# Patient Record
Sex: FEMALE | Race: WHITE | NOT HISPANIC OR LATINO | Employment: UNEMPLOYED | URBAN - METROPOLITAN AREA
[De-identification: names, ages, dates, MRNs, and addresses within clinical notes are randomized per-mention and may not be internally consistent; named-entity substitution may affect disease eponyms.]

---

## 2018-12-21 DIAGNOSIS — Z01.812 PRE-OPERATIVE LABORATORY EXAMINATION: ICD-10-CM

## 2018-12-21 LAB
ALBUMIN SERPL BCP-MCNC: 4.3 G/DL (ref 3.2–4.9)
ALBUMIN/GLOB SERPL: 1.4 G/DL
ALP SERPL-CCNC: 47 U/L (ref 30–99)
ALT SERPL-CCNC: 19 U/L (ref 2–50)
ANION GAP SERPL CALC-SCNC: 7 MMOL/L (ref 0–11.9)
AST SERPL-CCNC: 24 U/L (ref 12–45)
BASOPHILS # BLD AUTO: 0.4 % (ref 0–1.8)
BASOPHILS # BLD: 0.03 K/UL (ref 0–0.12)
BILIRUB SERPL-MCNC: 0.5 MG/DL (ref 0.1–1.5)
BUN SERPL-MCNC: 10 MG/DL (ref 8–22)
CALCIUM SERPL-MCNC: 9.5 MG/DL (ref 8.5–10.5)
CHLORIDE SERPL-SCNC: 102 MMOL/L (ref 96–112)
CO2 SERPL-SCNC: 26 MMOL/L (ref 20–33)
CREAT SERPL-MCNC: 0.76 MG/DL (ref 0.5–1.4)
EOSINOPHIL # BLD AUTO: 0.29 K/UL (ref 0–0.51)
EOSINOPHIL NFR BLD: 3.5 % (ref 0–6.9)
ERYTHROCYTE [DISTWIDTH] IN BLOOD BY AUTOMATED COUNT: 42.6 FL (ref 35.9–50)
GLOBULIN SER CALC-MCNC: 3.1 G/DL (ref 1.9–3.5)
GLUCOSE SERPL-MCNC: 66 MG/DL (ref 65–99)
HCT VFR BLD AUTO: 43.6 % (ref 37–47)
HGB BLD-MCNC: 14.3 G/DL (ref 12–16)
IMM GRANULOCYTES # BLD AUTO: 0.03 K/UL (ref 0–0.11)
IMM GRANULOCYTES NFR BLD AUTO: 0.4 % (ref 0–0.9)
LYMPHOCYTES # BLD AUTO: 2.57 K/UL (ref 1–4.8)
LYMPHOCYTES NFR BLD: 30.8 % (ref 22–41)
MCH RBC QN AUTO: 30.3 PG (ref 27–33)
MCHC RBC AUTO-ENTMCNC: 32.8 G/DL (ref 33.6–35)
MCV RBC AUTO: 92.4 FL (ref 81.4–97.8)
MONOCYTES # BLD AUTO: 0.76 K/UL (ref 0–0.85)
MONOCYTES NFR BLD AUTO: 9.1 % (ref 0–13.4)
NEUTROPHILS # BLD AUTO: 4.66 K/UL (ref 2–7.15)
NEUTROPHILS NFR BLD: 55.8 % (ref 44–72)
NRBC # BLD AUTO: 0 K/UL
NRBC BLD-RTO: 0 /100 WBC
PLATELET # BLD AUTO: 298 K/UL (ref 164–446)
PMV BLD AUTO: 10.9 FL (ref 9–12.9)
POTASSIUM SERPL-SCNC: 4.3 MMOL/L (ref 3.6–5.5)
PROT SERPL-MCNC: 7.4 G/DL (ref 6–8.2)
RBC # BLD AUTO: 4.72 M/UL (ref 4.2–5.4)
SODIUM SERPL-SCNC: 135 MMOL/L (ref 135–145)
WBC # BLD AUTO: 8.3 K/UL (ref 4.8–10.8)

## 2018-12-21 PROCEDURE — 36415 COLL VENOUS BLD VENIPUNCTURE: CPT

## 2018-12-21 PROCEDURE — 80053 COMPREHEN METABOLIC PANEL: CPT

## 2018-12-21 PROCEDURE — 85025 COMPLETE CBC W/AUTO DIFF WBC: CPT

## 2018-12-21 RX ORDER — ONDANSETRON 4 MG/1
4 TABLET, ORALLY DISINTEGRATING ORAL PRN
COMMUNITY
End: 2021-10-19

## 2018-12-21 RX ORDER — PANTOPRAZOLE SODIUM 40 MG/1
40 TABLET, DELAYED RELEASE ORAL EVERY MORNING
COMMUNITY
End: 2021-10-19

## 2018-12-26 ENCOUNTER — HOSPITAL ENCOUNTER (OUTPATIENT)
Facility: MEDICAL CENTER | Age: 23
End: 2018-12-26
Attending: SURGERY | Admitting: SURGERY
Payer: COMMERCIAL

## 2018-12-26 VITALS
HEART RATE: 65 BPM | BODY MASS INDEX: 29.8 KG/M2 | HEIGHT: 66 IN | WEIGHT: 185.41 LBS | OXYGEN SATURATION: 97 % | RESPIRATION RATE: 16 BRPM | SYSTOLIC BLOOD PRESSURE: 115 MMHG | DIASTOLIC BLOOD PRESSURE: 77 MMHG | TEMPERATURE: 97.8 F

## 2018-12-26 DIAGNOSIS — G89.18 PAIN FOLLOWING SURGERY OR PROCEDURE: ICD-10-CM

## 2018-12-26 LAB
HCG UR QL: NEGATIVE
PATHOLOGY CONSULT NOTE: NORMAL
SP GR UR REFRACTOMETRY: 1.02

## 2018-12-26 PROCEDURE — 700111 HCHG RX REV CODE 636 W/ 250 OVERRIDE (IP): Performed by: ANESTHESIOLOGY

## 2018-12-26 PROCEDURE — 160035 HCHG PACU - 1ST 60 MINS PHASE I: Performed by: SURGERY

## 2018-12-26 PROCEDURE — A9270 NON-COVERED ITEM OR SERVICE: HCPCS | Performed by: ANESTHESIOLOGY

## 2018-12-26 PROCEDURE — 501571 HCHG TROCAR, SEPARATOR 12X100: Performed by: SURGERY

## 2018-12-26 PROCEDURE — 501570 HCHG TROCAR, SEPARATOR: Performed by: SURGERY

## 2018-12-26 PROCEDURE — 501583 HCHG TROCAR, THRD CAN&SEAL 5X100: Performed by: SURGERY

## 2018-12-26 PROCEDURE — 502571 HCHG PACK, LAP CHOLE: Performed by: SURGERY

## 2018-12-26 PROCEDURE — 160025 RECOVERY II MINUTES (STATS): Performed by: SURGERY

## 2018-12-26 PROCEDURE — 160036 HCHG PACU - EA ADDL 30 MINS PHASE I: Performed by: SURGERY

## 2018-12-26 PROCEDURE — 501399 HCHG SPECIMAN BAG, ENDO CATC: Performed by: SURGERY

## 2018-12-26 PROCEDURE — 501338 HCHG SHEARS, ENDO: Performed by: SURGERY

## 2018-12-26 PROCEDURE — 160039 HCHG SURGERY MINUTES - EA ADDL 1 MIN LEVEL 3: Performed by: SURGERY

## 2018-12-26 PROCEDURE — 700101 HCHG RX REV CODE 250

## 2018-12-26 PROCEDURE — 160048 HCHG OR STATISTICAL LEVEL 1-5: Performed by: SURGERY

## 2018-12-26 PROCEDURE — 160046 HCHG PACU - 1ST 60 MINS PHASE II: Performed by: SURGERY

## 2018-12-26 PROCEDURE — 160009 HCHG ANES TIME/MIN: Performed by: SURGERY

## 2018-12-26 PROCEDURE — 500002 HCHG ADHESIVE, DERMABOND: Performed by: SURGERY

## 2018-12-26 PROCEDURE — 160002 HCHG RECOVERY MINUTES (STAT): Performed by: SURGERY

## 2018-12-26 PROCEDURE — 160028 HCHG SURGERY MINUTES - 1ST 30 MINS LEVEL 3: Performed by: SURGERY

## 2018-12-26 PROCEDURE — 501838 HCHG SUTURE GENERAL: Performed by: SURGERY

## 2018-12-26 PROCEDURE — 81025 URINE PREGNANCY TEST: CPT

## 2018-12-26 PROCEDURE — 700111 HCHG RX REV CODE 636 W/ 250 OVERRIDE (IP)

## 2018-12-26 PROCEDURE — 160047 HCHG PACU  - EA ADDL 30 MINS PHASE II: Performed by: SURGERY

## 2018-12-26 PROCEDURE — 88304 TISSUE EXAM BY PATHOLOGIST: CPT

## 2018-12-26 PROCEDURE — 700102 HCHG RX REV CODE 250 W/ 637 OVERRIDE(OP): Performed by: ANESTHESIOLOGY

## 2018-12-26 RX ORDER — MIDAZOLAM HYDROCHLORIDE 1 MG/ML
1 INJECTION INTRAMUSCULAR; INTRAVENOUS
Status: DISCONTINUED | OUTPATIENT
Start: 2018-12-26 | End: 2018-12-26 | Stop reason: HOSPADM

## 2018-12-26 RX ORDER — OXYCODONE HCL 5 MG/5 ML
5 SOLUTION, ORAL ORAL
Status: COMPLETED | OUTPATIENT
Start: 2018-12-26 | End: 2018-12-26

## 2018-12-26 RX ORDER — HYDROMORPHONE HYDROCHLORIDE 1 MG/ML
0.1 INJECTION, SOLUTION INTRAMUSCULAR; INTRAVENOUS; SUBCUTANEOUS
Status: DISCONTINUED | OUTPATIENT
Start: 2018-12-26 | End: 2018-12-26 | Stop reason: HOSPADM

## 2018-12-26 RX ORDER — SODIUM CHLORIDE, SODIUM LACTATE, POTASSIUM CHLORIDE, CALCIUM CHLORIDE 600; 310; 30; 20 MG/100ML; MG/100ML; MG/100ML; MG/100ML
INJECTION, SOLUTION INTRAVENOUS ONCE
Status: COMPLETED | OUTPATIENT
Start: 2018-12-26 | End: 2018-12-26

## 2018-12-26 RX ORDER — DIPHENHYDRAMINE HYDROCHLORIDE 50 MG/ML
12.5 INJECTION INTRAMUSCULAR; INTRAVENOUS
Status: DISCONTINUED | OUTPATIENT
Start: 2018-12-26 | End: 2018-12-26 | Stop reason: HOSPADM

## 2018-12-26 RX ORDER — HALOPERIDOL 5 MG/ML
1 INJECTION INTRAMUSCULAR
Status: DISCONTINUED | OUTPATIENT
Start: 2018-12-26 | End: 2018-12-26 | Stop reason: HOSPADM

## 2018-12-26 RX ORDER — SODIUM CHLORIDE, SODIUM LACTATE, POTASSIUM CHLORIDE, CALCIUM CHLORIDE 600; 310; 30; 20 MG/100ML; MG/100ML; MG/100ML; MG/100ML
INJECTION, SOLUTION INTRAVENOUS CONTINUOUS
Status: DISCONTINUED | OUTPATIENT
Start: 2018-12-26 | End: 2018-12-26 | Stop reason: HOSPADM

## 2018-12-26 RX ORDER — ACETAMINOPHEN 500 MG
1000 TABLET ORAL ONCE
Status: COMPLETED | OUTPATIENT
Start: 2018-12-26 | End: 2018-12-26

## 2018-12-26 RX ORDER — HYDROMORPHONE HYDROCHLORIDE 1 MG/ML
0.4 INJECTION, SOLUTION INTRAMUSCULAR; INTRAVENOUS; SUBCUTANEOUS
Status: DISCONTINUED | OUTPATIENT
Start: 2018-12-26 | End: 2018-12-26 | Stop reason: HOSPADM

## 2018-12-26 RX ORDER — ONDANSETRON 2 MG/ML
4 INJECTION INTRAMUSCULAR; INTRAVENOUS
Status: COMPLETED | OUTPATIENT
Start: 2018-12-26 | End: 2018-12-26

## 2018-12-26 RX ORDER — OXYCODONE HCL 10 MG/1
10 TABLET, FILM COATED, EXTENDED RELEASE ORAL ONCE
Status: COMPLETED | OUTPATIENT
Start: 2018-12-26 | End: 2018-12-26

## 2018-12-26 RX ORDER — OXYCODONE HCL 5 MG/5 ML
10 SOLUTION, ORAL ORAL
Status: COMPLETED | OUTPATIENT
Start: 2018-12-26 | End: 2018-12-26

## 2018-12-26 RX ORDER — HYDROMORPHONE HYDROCHLORIDE 1 MG/ML
0.2 INJECTION, SOLUTION INTRAMUSCULAR; INTRAVENOUS; SUBCUTANEOUS
Status: DISCONTINUED | OUTPATIENT
Start: 2018-12-26 | End: 2018-12-26 | Stop reason: HOSPADM

## 2018-12-26 RX ORDER — TIZANIDINE 4 MG/1
4 TABLET ORAL
COMMUNITY
End: 2021-10-19

## 2018-12-26 RX ORDER — BUPIVACAINE HYDROCHLORIDE 5 MG/ML
INJECTION, SOLUTION PERINEURAL
Status: DISCONTINUED | OUTPATIENT
Start: 2018-12-26 | End: 2018-12-26 | Stop reason: HOSPADM

## 2018-12-26 RX ORDER — OXYCODONE HYDROCHLORIDE AND ACETAMINOPHEN 5; 325 MG/1; MG/1
1 TABLET ORAL EVERY 4 HOURS PRN
Qty: 15 TAB | Refills: 0 | Status: SHIPPED | OUTPATIENT
Start: 2018-12-26 | End: 2019-01-05

## 2018-12-26 RX ORDER — GABAPENTIN 300 MG/1
300 CAPSULE ORAL ONCE
Status: COMPLETED | OUTPATIENT
Start: 2018-12-26 | End: 2018-12-26

## 2018-12-26 RX ADMIN — HYDROMORPHONE HYDROCHLORIDE 0.4 MG: 1 INJECTION, SOLUTION INTRAMUSCULAR; INTRAVENOUS; SUBCUTANEOUS at 09:47

## 2018-12-26 RX ADMIN — SODIUM CHLORIDE, SODIUM LACTATE, POTASSIUM CHLORIDE, CALCIUM CHLORIDE: 600; 310; 30; 20 INJECTION, SOLUTION INTRAVENOUS at 07:56

## 2018-12-26 RX ADMIN — ACETAMINOPHEN 1000 MG: 500 TABLET, FILM COATED ORAL at 07:59

## 2018-12-26 RX ADMIN — GABAPENTIN 300 MG: 300 CAPSULE ORAL at 07:59

## 2018-12-26 RX ADMIN — FENTANYL CITRATE 50 MCG: 50 INJECTION, SOLUTION INTRAMUSCULAR; INTRAVENOUS at 09:21

## 2018-12-26 RX ADMIN — FENTANYL CITRATE 50 MCG: 50 INJECTION, SOLUTION INTRAMUSCULAR; INTRAVENOUS at 09:16

## 2018-12-26 RX ADMIN — HYDROMORPHONE HYDROCHLORIDE 0.4 MG: 1 INJECTION, SOLUTION INTRAMUSCULAR; INTRAVENOUS; SUBCUTANEOUS at 09:26

## 2018-12-26 RX ADMIN — OXYCODONE HYDROCHLORIDE 10 MG: 10 TABLET, FILM COATED, EXTENDED RELEASE ORAL at 07:59

## 2018-12-26 RX ADMIN — MIDAZOLAM 1 MG: 1 INJECTION INTRAMUSCULAR; INTRAVENOUS at 09:20

## 2018-12-26 RX ADMIN — OXYCODONE HYDROCHLORIDE 10 MG: 5 SOLUTION ORAL at 10:00

## 2018-12-26 RX ADMIN — MIDAZOLAM 1 MG: 1 INJECTION INTRAMUSCULAR; INTRAVENOUS at 10:02

## 2018-12-26 RX ADMIN — ONDANSETRON 4 MG: 2 INJECTION INTRAMUSCULAR; INTRAVENOUS at 09:43

## 2018-12-26 RX ADMIN — FENTANYL CITRATE 50 MCG: 50 INJECTION, SOLUTION INTRAMUSCULAR; INTRAVENOUS at 11:46

## 2018-12-26 RX ADMIN — HYDROMORPHONE HYDROCHLORIDE 0.2 MG: 1 INJECTION, SOLUTION INTRAMUSCULAR; INTRAVENOUS; SUBCUTANEOUS at 09:31

## 2018-12-26 ASSESSMENT — PAIN SCALES - GENERAL
PAINLEVEL_OUTOF10: 8
PAINLEVEL_OUTOF10: 4
PAINLEVEL_OUTOF10: 8
PAINLEVEL_OUTOF10: 5
PAINLEVEL_OUTOF10: 3
PAINLEVEL_OUTOF10: 4
PAINLEVEL_OUTOF10: 4
PAINLEVEL_OUTOF10: 6
PAINLEVEL_OUTOF10: 6
PAINLEVEL_OUTOF10: 5
PAINLEVEL_OUTOF10: 6

## 2018-12-26 NOTE — DISCHARGE INSTRUCTIONS
ACTIVITY: Rest and take it easy for the first 24 hours.  A responsible adult is recommended to remain with you during that time.  It is normal to feel sleepy.  We encourage you to not do anything that requires balance, judgment or coordination.    MILD FLU-LIKE SYMPTOMS ARE NORMAL. YOU MAY EXPERIENCE GENERALIZED MUSCLE ACHES, THROAT IRRITATION, HEADACHE AND/OR SOME NAUSEA.    FOR 24 HOURS DO NOT:  Drive, operate machinery or run household appliances.  Drink beer or alcoholic beverages.   Make important decisions or sign legal documents.    SPECIAL INSTRUCTIONS:   Laparoscopic Cholecystectomy Discharge Instructions:     1. DIET: Upon discharge from the hospital you may resume your normal preoperative diet. Depending on how you are feeling and whether you have nausea or not, you may wish to stay with a bland diet for the first few days. However, you can advance this as quickly as you feel ready.     2. ACTIVITIES: You have a 10 pound weight restriction for two weeks after surgery. This means no lifting anything heavier than a gallon of milk. Routine activities such as bathing, walking, going up and down stairs, and driving* (see below) are safe. Avoid strenuous activities and exercise that involves twisting, bending, and, running.     3. DRIVING: You may drive whenever you are off pain medications and are able to perform the activities needed to drive, i.e. turning, bending, twisting, wearing a seat belt, etc.     4. BATHING: You may get the wound wet at any time after leaving the hospital. You may shower, but do not submerge in a bath or a pool for at least a week.     5. BOWEL FUNCTION: A few patients, after this operation, will develop either frequent or loose stools after meals. This usually corrects itself after a few days, to a few months. Much more common than loose stools, is constipation. The combination of pain medication and decreased activity after surgery can cause constipation in otherwise normal  patients. If you feel this is occurring, take an over the counter laxative (Milk of Magnesia, Ex-Lax, Senokot, Miralax, Magnesium Citrate, etc.) until the problem has resolved.     6. PAIN MEDICATION: You will be given a prescription for pain medication at discharge. Please take these as directed. It is important to remember not to take medications on an empty stomach as this may cause nausea. Wean the use of pain medication as tolerated as soon as possible.   7.CALL IF YOU HAVE: (1) Fevers to more than 101F, (2) Unusual chest or leg pain, (3) Drainage or fluid from incision that may be foul smelling, increased tenderness or soreness at the wound or the wound edges are no longer together, redness or swelling at the incision site. Please do not hesitate to call with any other questions.     8. APPOINTMENT: Contact our office at [phone] for a follow-up appointment in 1 to 2 weeks following your procedure. If you have any additional questions, please do not hesitate to call the office and speak to either myself or the physician on call.     Office address:   [address] [place] [address], [address]   [place] [address]   Gene Covarrubias MD PhD [facility] Colon and Rectal Surgeon [phone]    DIET: To avoid nausea, slowly advance diet as tolerated, avoiding spicy or greasy foods for the first day.  Add more substantial food to your diet according to your physician's instructions.  Babies can be fed formula or breast milk as soon as they are hungry.  INCREASE FLUIDS AND FIBER TO AVOID CONSTIPATION.    SURGICAL DRESSING/BATHING: BATHING: You may get the wound wet at any time after leaving the hospital. You may shower, but do not submerge in a bath or a pool for at least a week.     FOLLOW-UP APPOINTMENT:  A follow-up appointment should be arranged with your doctor in 1-2 weeks; call to schedule.    You should CALL YOUR PHYSICIAN if you develop:  Fever greater than 101 degrees F.  Pain not relieved by medication, or  persistent nausea or vomiting.  Excessive bleeding (blood soaking through dressing) or unexpected drainage from the wound.  Extreme redness or swelling around the incision site, drainage of pus or foul smelling drainage.  Inability to urinate or empty your bladder within 8 hours.  Problems with breathing or chest pain.    You should call 911 if you develop problems with breathing or chest pain.  If you are unable to contact your doctor or surgical center, you should go to the nearest emergency room or urgent care center.  Physician's telephone #: Dr. Covarrubias [phone]    If any questions arise, call your doctor.  If your doctor is not available, please feel free to call the Surgical Center at [phone].  The Center is open Monday through Friday from 7AM to 7PM.  You can also call the [facility] HOTLINE open 24 hours/day, 7 days/week and speak to a nurse at [phone], or toll free at [phone].    A registered nurse may call you a few days after your surgery to see how you are doing after your procedure.    MEDICATIONS: Resume taking daily medication.  Take prescribed pain medication with food.  If no medication is prescribed, you may take non-aspirin pain medication if needed.  PAIN MEDICATION CAN BE VERY CONSTIPATING.  Take a stool softener or laxative such as senokot, pericolace, or milk of magnesia if needed.    Prescription given for Percocet (pain).  Last pain medication given at 10:00 AM ( Oxycodone 10 mg ), may take next dose around 2:00 PM.    If your physician has prescribed pain medication that includes Acetaminophen (Tylenol), do not take additional Acetaminophen (Tylenol) while taking the prescribed medication.    Depression / Suicide Risk    As you are discharged from this [facility] facility, it is important to learn how to keep safe from harming yourself.    Recognize the warning signs:  · Abrupt changes in personality, positive or negative- including increase in energy   · Giving away  possessions  · Change in eating patterns- significant weight changes-  positive or negative  · Change in sleeping patterns- unable to sleep or sleeping all the time   · Unwillingness or inability to communicate  · Depression  · Unusual sadness, discouragement and loneliness  · Talk of wanting to die  · Neglect of personal appearance   · Rebelliousness- reckless behavior  · Withdrawal from people/activities they love  · Confusion- inability to concentrate     If you or a loved one observes any of these behaviors or has concerns about self-harm, here's what you can do:  · Talk about it- your feelings and reasons for harming yourself  · Remove any means that you might use to hurt yourself (examples: pills, rope, extension cords, firearm)  · Get professional help from the community (Mental Health, Substance Abuse, psychological counseling)  · Do not be alone:Call your Safe Contact- someone whom you trust who will be there for you.  · Call your local CRISIS HOTLINE 244-8623 or 260-809-6421  · Call your local Children's Mobile Crisis Response Team [place] (714) 631-4670 or www.SocStock  · Call the toll free National Suicide Prevention Hotlines   · National Suicide Prevention Lifeline 536-887-EJZM (8540)  · National Hope Line Network 800-SUICIDE (369-9233)

## 2018-12-26 NOTE — OP REPORT
DATE OF OPERATION: 12/26/2018    PREOPERATIVE DIAGNOSIS: Right upper quadrant pain    POSTOPERATIVE DIAGNOSIS: chronic cholecystitis..    PROCEDURE PERFORMED: Laparoscopic cholecystectomy.     SURGEON: Gene Covarrubias    ASSISTANT: Rosalind Armendariz.    ANESTHESIOLOGIST:  Bre Garner MD (Agnes)..    ANESTHESIA: General endotracheal anesthesia.    INDICATIONS: The patient is a 23 y.o. female with a history and physical consistent with symptomatic gallbladder disease. The patient is taken to the operating room today for laparoscopic cholecystectomy.     FINDINGS: The gallbladder showed signs of chronic inflammation with dense adhesions to the bowel and to the liver bed.     SPECIMEN: Gallbladder with contents.    ESTIMATED BLOOD LOSS: 5 mL.    PROCEDURE:Informed consent was obtained pre-operatively.  The patient was taken back to the operating room and placed in supine position.  General endotracheal anesthesia was administered and the patient was intubated. Intravenous antibiotics were administered by the anesthesiologist in correct time interval. Sequential compression devices were placed. The patient's arms were tucked and all joints and skin surfaces were padded and protected appropriately. The abdomen was prepped and draped in a sterile fashion.  A time-out was performed and the patient and procedure were both verified.      Marcaine 0.5% without epinephrine was used to infiltrate the port sites. A 5 mm ena-umbilical incision was made an Optiview technique was utilized with a 0 5 mm scope to enter the abdominal cavity. Carbon dioxide gas was applied to the port and pneumoperitoneum was achieved.  A 5  millimeter 0° laparoscope was placed through this port.  The abdominal contents were inspected noted to be free of injury  port placement.  The scope was exchanged for a 5 mm 30° laparoscope and a 10/11mm and two 5 mm ports were then placed in the epigastric region, right subcostal, and right lateral locations under  directed visualization, respectively.  He patient was then positioned and a left lateral decubitus with reverse Trendelenburg position    The gallbladder was identified, elevated, and retracted cephalad over the liver edge by the fundus to expose the infundibulum. Dissection was carried out within the hepatocystic triangle, definitively identifying the cystic duct and cystic artery. The cystic duct could be seen clearly terminating at the infundibulum.  The critical view of safety was achieved.      The cystic duct and artery were each clipped once proximally, twice distally, and divided. The gallbladder was dissected free from the undersurface of the liver using electrocautery and placed within an EndoCatch bag. The gallbladder was delivered intact from the abdominal cavity through the epigastric port site and submitted for pathology. The gallbladder fossa was inspected and hemostasis was noted to be satisfactory.     The epigastric port site fascia was closed with a 0 Vicryl suture using a suture passer. Once tied down, the fascia was noted to be tight and well approximated.    The ports were opened and the abdomen was allowed to deflate. All ports were then removed.  The port site skin incisions were closed with interrupted 4-0 Vicryl subcuticular sutures.    The patient tolerated the procedure well and there were no apparent complications. All sponge, sharps, and instrument counts were correct on 2 separate occasions. The patient was awakened, extubated, and transferred to the PACU in satisfactory condition.     Gene Covarrubias MD PhD  [facility]  Colon and Rectal Surgeon  [phone]

## 2018-12-26 NOTE — OR NURSING
Upon helping pt up to get dressed, pt began experiencing severe abdominal pain/cramping. Pt assisted back into bed and layed back down into a comfortable position. Pt was medicated with 50 mcg of IV fentanyl.     Pt states pain is now at a tolerable level. Pt was able to sit up and get dressed comfortably. Instructed pt to keep up on her oral pain meds at home to stay ahead of the pain.    Pt's VSS; denies N/V; states pain is at tolerable level. Dressing CDI to abd. D/c orders received. IV dc'd. Pt changed into clothing with assistance.  Discharge instructions given; pt and family verbalized understanding and questions answered. Patient states ready to d/c home. Prescriptions given. Pt dc'd in w/c with CNA in stable condition.

## 2018-12-26 NOTE — OR NURSING
0913: received to PACU via gurney. Respirations spontaneous. Abdomen soft. With  4 surgical lap sites with dermabond open to air. Ice pack placed over the abdomen. C/o pain.  0916: medicated for pain. See MAR.  0921: still c/o pain. Medicated. See MAR.  0926: patient crying. C/o abdominal pain. Medicated. See MAR.  0931: pain scale remains at 8/10. Medicated. See MAR.  0943: c/o nausea. Medicated. See MAR  0947: pain scale 6/10. Medicated. See MAR  1000: oral pain medications given. See MAR. Sips of water given. Tolerated well.  1015: patient sleeping. Comfortable.  1030: pain scale 4/10 and tolerable. Denies any nausea.  1056: c/o abdominal pain. Medicated. See MAR  1104: report called to Deanna BARRIOS  51653: transported via [place] to PACU 2 . Stable.

## 2021-10-19 ENCOUNTER — OFFICE VISIT (OUTPATIENT)
Dept: BEHAVIORAL HEALTH | Facility: PSYCHIATRIC FACILITY | Age: 26
End: 2021-10-19
Payer: COMMERCIAL

## 2021-10-19 VITALS — BODY MASS INDEX: 35.19 KG/M2 | WEIGHT: 218 LBS

## 2021-10-19 DIAGNOSIS — F41.1 GENERALIZED ANXIETY DISORDER: ICD-10-CM

## 2021-10-19 DIAGNOSIS — F33.1 MODERATE EPISODE OF RECURRENT MAJOR DEPRESSIVE DISORDER (HCC): ICD-10-CM

## 2021-10-19 PROBLEM — G89.18 PAIN FOLLOWING SURGERY OR PROCEDURE: Status: RESOLVED | Noted: 2018-12-26 | Resolved: 2021-10-19

## 2021-10-19 PROCEDURE — 99213 OFFICE O/P EST LOW 20 MIN: CPT | Mod: GE | Performed by: STUDENT IN AN ORGANIZED HEALTH CARE EDUCATION/TRAINING PROGRAM

## 2021-10-19 RX ORDER — CLONAZEPAM 0.5 MG/1
0.5 TABLET ORAL
COMMUNITY
End: 2022-03-22

## 2021-10-19 RX ORDER — LORATADINE 10 MG/1
10 TABLET ORAL DAILY
COMMUNITY

## 2021-10-19 RX ORDER — BUSPIRONE HYDROCHLORIDE 10 MG/1
20 TABLET ORAL 2 TIMES DAILY
Qty: 120 TABLET | Refills: 2 | Status: SHIPPED | OUTPATIENT
Start: 2021-10-19 | End: 2022-01-24

## 2021-10-19 RX ORDER — SERTRALINE HYDROCHLORIDE 100 MG/1
150 TABLET, FILM COATED ORAL DAILY
Qty: 30 TABLET | Refills: 2 | Status: SHIPPED | OUTPATIENT
Start: 2021-10-19 | End: 2021-11-23

## 2021-10-19 RX ORDER — PSEUDOEPHEDRINE HCL 30 MG
60 TABLET ORAL 2 TIMES DAILY PRN
COMMUNITY
End: 2023-03-21

## 2021-10-19 ASSESSMENT — ENCOUNTER SYMPTOMS
RESPIRATORY NEGATIVE: 1
GASTROINTESTINAL NEGATIVE: 1
NEUROLOGICAL NEGATIVE: 1
MUSCULOSKELETAL NEGATIVE: 1
NERVOUS/ANXIOUS: 1
CARDIOVASCULAR NEGATIVE: 1
CONSTITUTIONAL NEGATIVE: 1
DEPRESSION: 1

## 2021-10-19 ASSESSMENT — PATIENT HEALTH QUESTIONNAIRE - PHQ9
2. FEELING DOWN, DEPRESSED, IRRITABLE, OR HOPELESS: NOT AT ALL
8. MOVING OR SPEAKING SO SLOWLY THAT OTHER PEOPLE COULD HAVE NOTICED. OR THE OPPOSITE, BEING SO FIGETY OR RESTLESS THAT YOU HAVE BEEN MOVING AROUND A LOT MORE THAN USUAL: NOT AT ALL
9. THOUGHTS THAT YOU WOULD BE BETTER OFF DEAD, OR OF HURTING YOURSELF: NOT AT ALL
5. POOR APPETITE OR OVEREATING: MORE THAN HALF THE DAYS
1. LITTLE INTEREST OR PLEASURE IN DOING THINGS: NOT AT ALL
4. FEELING TIRED OR HAVING LITTLE ENERGY: SEVERAL DAYS
7. TROUBLE CONCENTRATING ON THINGS, SUCH AS READING THE NEWSPAPER OR WATCHING TELEVISION: NOT AT ALL
3. TROUBLE FALLING OR STAYING ASLEEP OR SLEEPING TOO MUCH: MORE THAN HALF THE DAYS
SUM OF ALL RESPONSES TO PHQ9 QUESTIONS 1 AND 2: 0
SUM OF ALL RESPONSES TO PHQ QUESTIONS 1-9: 6
6. FEELING BAD ABOUT YOURSELF - OR THAT YOU ARE A FAILURE OR HAVE LET YOURSELF OR YOUR FAMILY DOWN: SEVERAL DAYS

## 2021-10-19 NOTE — PROGRESS NOTES
[facility]  Medication Management Note    Evaluation completed by: Yoon Cruz M.D.   Date of Service: 10/19/21   Appointment type: in-office appointment.    Information below was collected from: patient    Special language or communication needs: No  Responded to any questions about patient rights: No  Reviewed limits of confidentiality: Yes  Confidentiality: The patient was informed that her medical records are confidential except for use by the treatment team in this clinic and others involved in her care.  Records may be shared with outside entities if the patient signs a release of information.  Information may be shared with appropriate authorities without a release of information to report instances of child/elder abuse or if it is determined she is in imminent risk of harm to self or others.     CHIEF COMPLAINT/REASON FOR VISIT  F/U med mgmt     HISTORY OF PRESENT ILLNESS  Cathy Spencer is a 25 y.o. old female who presents today for follow up management of depression and anxiety.   Pt was last seen on one month ago, at which time the plan was to increase sertraline 150mg.      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 She has trouble emailing the committee her reading list and is stressed about writing a 20 page paper in one week. She has a therapist but hasn't worked specifically on anxiety management strategies. She is no longer having thoughts of suicide or passively not wanting to exist anymore.      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    CURRENT MEDICATIONS, ADHERENCE, AND SIDE EFFECTS   150mg sertraline  buspar 20mg BID  klonipin 0.5mg (a few times per week)  Sudafed BID  claritin  Reports adherent    Current weight: 218; struggling to lose weight due to eating habits although recently started exercising    PSYCHIATRIC REVIEW OF SYSTEMS  Depression: improved  greatly  Anxiety: improving although still present   Panic: denies panic attacks   OCD: denies  PTSD: denies  Xochitl: denies  Psychosis: denies  ADHD: denies  Eating Disorders: some binge eating  Autism Spectrum Disorder: denies  Sleep: still having vivid dreams and restlessness at night; reports themes of stress and escape/survival  Behavioral: denies impulsivity    MEDICAL REVIEW OF SYSTEMS  Review of Systems   Constitutional: Negative.    HENT: Positive for congestion.    Respiratory: Negative.    Cardiovascular: Negative.    Gastrointestinal: Negative.    Genitourinary: Negative.    Musculoskeletal: Negative.    Neurological: Negative.    Psychiatric/Behavioral: Positive for depression. Negative for suicidal ideas. The patient is nervous/anxious.        CURRENT MEDICATIONS  150mg sertraline  buspar (2 BID)  klonipin (a few times per week)  Sudafed BID  claritin    Current weight: 218    ALLERGIES  No Known Allergies     PAST PSYCHIATRIC HISTORY  No hx of psychiatric hospitalization  No hx of suicide attempts or self harm    SOCIAL HISTORY SUMMARY  Lives alone  In masters program at [employer]  Father  earlier this year      MEDICAL HISTORY  Past Medical History:  No date: Anxiety  No date: Bowel habit changes      Comment:  constipation  No date: Depression  No date: Heart burn  No date: Indigestion  2018: Pain      Comment:  back and abd., -2/10   Past Surgical History:   Procedure Laterality Date   • GABO BY LAPAROSCOPY N/A 2018    Procedure: GABO BY LAPAROSCOPY;  Surgeon: Gene Covarrubias M.D.;  Location: SURGERY [facility];  Service: General   • OTHER SURGICAL PROCEDURE  2018    EGD            FAMILY PSYCHIATRIC HISTORY  See below; half brother bipolar    FAMILY MEDICAL HISTORY  Family History   Problem Relation Age of Onset   • Arthritis Mother    • Psychiatric Illness Mother    • Diabetes Father    • Stroke Father    • Heart Disease Father    • Psychiatric Illness Other    • Bipolar  "disorder Other          PHYSICAL EXAMINATION  Vital signs: There were no vitals taken for this visit.  Musculoskeletal: Gait is normal. No gross abnormalities noted.   Abnormal movements: none    MENTAL STATUS EXAMINATION    General: Cathy Spencer appears stated age and exhibits grooming which is appropriate.  Hygiene is good.     Behavior: Pt is calm and cooperative with interview.  No apparent distress.  Eye contact is appropriate.   Psychomotor: Psychomotor agitation or retardation not noted.  Tics or tremors not noted.  Speech: rate within normal limits and volume within normal limits  Language: fluent english  Mood: \"good\"  Affect: Congruent with content and euthymic,  Thought Process: Logical and Goal-directed  Thought Content: denies suicidal ideation, denies homicidal ideation. Within normal limits  Perception: denies auditory hallucinations, denies visual hallucinations. No delusions noted on interview.  Also noted on exam: none  Attention span and concentration: intact  Orientation: Alert and Fully Oriented  Recent and remote memory: No gross evidence of memory deficits  Insight: Good  Judgment: Good    SAFETY ASSESSMENT - RISK TO SELF  • Current suicide attempts or self harm: No  • Past suicide attempts or self harm: No  • History of suicide by family member: No  • History of suicide by friend/significant other: No  • Recent change in amount/specificity/intensity of suicidal thoughts or self-harm behavior: No  • Ongoing substance use disorder: No  • Current access to firearms, medications, or other identified means of suicide/self-harm: No  • If yes, willing to restrict access to means of suicide/self-harm: N/a  • Protective factors present: Yes     SAFETY ASSESSMENT - RISK TO OTHERS  • Current aggressive behavior or risk to others: No  • Past aggressive behavior or risk to others: No  • Recent change in amount/specificity/intensity of thoughts or threats to harm others? N/a  • Current access to " firearms/other identified means of harm? No  • If yes, willing to restrict access to weapons/means of harm? N/a     CURRENT RISK ASSESSMENT       Suicide: Low       Homicide: Low       Self-Harm: Low       Relapse: Not applicable       Crisis Safety Plan Reviewed Yes    [place]  records   reviewed.  No concerns about misuse of controlled substance.    ASSESSMENT  Cathy Spencer is a 25 y.o. old female presenting for follow up management of MDD with anxious distress, JONNY. Recently discontinued antipsychotic and started sertraline, now on 150mg daily and reports good effect on mood and anxiety. Also on buspar 20mg BID to good effect. She is agreeable to plan to gradually discontinue klonipin. She will continue with her therapist. She denies suicidal thoughts, intent, or plan today. PHQ9 today is 6; GAD7 today is 3. Controlled substance agreement signed today.    Biological: recent weight gain  Psychological: stress of impending masters thesis  Social: chronic feeling lonely, difficulty making social connections    Today, will plan to continue current regimen of sertraline 150mg daily, buspar 20mg BID, and klonipin 0.5mg daily prn for no more than 3x weekly dose with plan to discontinue. She has klonipin leftover so no new Rx needed for 1 month.      DIAGNOSES/PLAN  1. Generalized anxiety disorder     2. Moderate episode of recurrent major depressive disorder (HCC)           Most recent labs done on August 2021 and showed unremarkable CBC, CMP.    • Medication options, alternatives (including no medications) and medication risks/benefits/side effects were discussed in detail.  • The patient was advised to call, message clinician on [employer], or come in to the clinic if symptoms worsen or if questions/issues regarding their medications arise.  The patient verbalized understanding and agreement.    • The patient was educated to call 911, call the suicide hotline, or go to the local ER if having thoughts of  suicide or homicide.  The patient verbalized understanding and agreement.   • The proposed treatment plan was discussed with the patient who was provided the opportunity to ask questions and make suggestions regarding alternative treatment. Patient verbalized understanding and expressed agreement with the plan.      Return to clinic in 4 weeks or sooner if symptoms worsen.    This appointment was supervised by attending psychiatrist, Ryan King MD, who agrees with assessment and treatment plan.  See attending attestation for more details.     No LOS data to display       Yoon Cruz M.D.  10/19/21

## 2021-11-01 DIAGNOSIS — F33.1 MODERATE EPISODE OF RECURRENT MAJOR DEPRESSIVE DISORDER (HCC): ICD-10-CM

## 2021-11-01 RX ORDER — TRAZODONE HYDROCHLORIDE 50 MG/1
50 TABLET ORAL NIGHTLY PRN
Qty: 30 TABLET | Refills: 3 | Status: SHIPPED | OUTPATIENT
Start: 2021-11-01 | End: 2021-11-23

## 2021-11-23 ENCOUNTER — OFFICE VISIT (OUTPATIENT)
Dept: BEHAVIORAL HEALTH | Facility: PSYCHIATRIC FACILITY | Age: 26
End: 2021-11-23
Payer: COMMERCIAL

## 2021-11-23 DIAGNOSIS — F41.1 GENERALIZED ANXIETY DISORDER: ICD-10-CM

## 2021-11-23 DIAGNOSIS — F64.0 GENDER IDENTITY DISORDER IN ADOLESCENTS OR ADULTS: ICD-10-CM

## 2021-11-23 DIAGNOSIS — F33.1 MODERATE EPISODE OF RECURRENT MAJOR DEPRESSIVE DISORDER (HCC): ICD-10-CM

## 2021-11-23 DIAGNOSIS — F12.90 MARIJUANA USE, EPISODIC: ICD-10-CM

## 2021-11-23 PROCEDURE — 99213 OFFICE O/P EST LOW 20 MIN: CPT | Mod: GE | Performed by: STUDENT IN AN ORGANIZED HEALTH CARE EDUCATION/TRAINING PROGRAM

## 2021-11-23 RX ORDER — TRAZODONE HYDROCHLORIDE 50 MG/1
TABLET ORAL
Qty: 30 TABLET | Refills: 3 | Status: SHIPPED | OUTPATIENT
Start: 2021-11-23 | End: 2022-02-22

## 2021-11-23 ASSESSMENT — ENCOUNTER SYMPTOMS
RESPIRATORY NEGATIVE: 1
CARDIOVASCULAR NEGATIVE: 1
BACK PAIN: 1
PSYCHIATRIC NEGATIVE: 1
GASTROINTESTINAL NEGATIVE: 1
NEUROLOGICAL NEGATIVE: 1
CONSTITUTIONAL NEGATIVE: 1
EYES NEGATIVE: 1

## 2021-11-23 NOTE — PROGRESS NOTES
[facility]  Medication Management Note    Evaluation completed by: Yoon Cruz M.D.   Date of Service: 11/23/21   Appointment type: in-office appointment.    Information below was collected from: patient    Special language or communication needs: No  Responded to any questions about patient rights: No  Reviewed limits of confidentiality: Yes  Confidentiality: The patient was informed that her medical records are confidential except for use by the treatment team in this clinic and others involved in her care.  Records may be shared with outside entities if the patient signs a release of information.  Information may be shared with appropriate authorities without a release of information to report instances of child/elder abuse or if it is determined she is in imminent risk of harm to self or others.     CHIEF COMPLAINT/REASON FOR VISIT  Med mgmt    HISTORY OF PRESENT ILLNESS  Cathy Spencer is a 25 y.o. old female who presents today for follow up management of MDD, JONNY.   Pt was last seen on 10/19, at which time the plan was to gradually decrease klonipin, continue buspar, continue sertraline. We had previously discontinued abilify due to weight gain and ineffectiveness; 1 month ago weight was 218lbs today it is 230lbs.     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 She will get the results next week and is confident she passed. Then, onto writing her thesis, hopefully graduate spring of 2023. She states her mood is holding steady. Her mother is flying in today to see her for the holiday, she is looking forward to this. The patient is still meeting with her therapist weekly. They are working on her anxiety and increasing her confidence in grad school. She states she feels more in control and able to cope.    She has been vaping marijuana daily to cope with the stress of her masters program. She was  encouraged to stop. She has been binge eating to deal with the stress and attributes her weight gain to this.      PSYCHOSOCIAL CHANGES SINCE LAST VISIT   Did comps for masters degree    CURRENT MEDICATIONS, ADHERENCE, AND SIDE EFFECTS   Current Outpatient Medications   Medication Instructions   • busPIRone (BUSPAR) 20 mg, Oral, 2 TIMES DAILY   • clonazePAM (KLONOPIN) 0.5 mg, Oral, 1 TIME DAILY PRN   • loratadine (CLARITIN) 10 mg, Oral, DAILY   • pseudoephedrine (SUDAFED) 60 mg, Oral, 2 TIMES DAILY PRN   • sertraline (ZOLOFT) 150 mg, Oral, DAILY   • traZODone (DESYREL) 50 mg, Oral, NIGHTLY PRN         PSYCHIATRIC REVIEW OF SYSTEMS  Depression: yes  Anxiety: yes   Panic: no   OCD: no  PTSD: no  Xochitl: no  Psychosis: no  ADHD: no  Eating Disorders: yes, binge eating  Autism Spectrum Disorder: no  Sleep: no   Behavioral: no    MEDICAL REVIEW OF SYSTEMS  Review of Systems   Constitutional: Negative.    HENT: Negative.    Eyes: Negative.    Respiratory: Negative.    Cardiovascular: Negative.    Gastrointestinal: Negative.    Genitourinary: Negative.    Musculoskeletal: Positive for back pain and joint pain.   Skin: Negative.    Neurological: Negative.    Endo/Heme/Allergies: Negative.    Psychiatric/Behavioral: Negative.        CURRENT MEDICATIONS  Current Outpatient Medications   Medication Instructions   • busPIRone (BUSPAR) 20 mg, Oral, 2 TIMES DAILY   • clonazePAM (KLONOPIN) 0.5 mg, Oral, 1 TIME DAILY PRN   • loratadine (CLARITIN) 10 mg, Oral, DAILY   • pseudoephedrine (SUDAFED) 60 mg, Oral, 2 TIMES DAILY PRN   • sertraline (ZOLOFT) 50 MG Tab No dose, route, or frequency recorded.   • traZODone (DESYREL) 50 mg, Oral, NIGHTLY PRN         ALLERGIES  No Known Allergies     PAST PSYCHIATRIC HISTORY  No hx of psychiatric hospitalization  No hx of suicide attempts or self harm    SOCIAL HISTORY SUMMARY  Lives alone  In masters program at [employer]; mother financially supports her and is good source of social support  Father   "earlier this year    MEDICAL HISTORY  Past Medical History:  No date: Anxiety  No date: Bowel habit changes      Comment:  constipation  No date: Depression  No date: Heart burn  No date: Indigestion  12/21/2018: Pain      Comment:  back and abd., 1-2/10   Past Surgical History:   Procedure Laterality Date   • GABO BY LAPAROSCOPY N/A 12/26/2018    Procedure: GABO BY LAPAROSCOPY;  Surgeon: Gene Covarrubias M.D.;  Location: SURGERY [facility];  Service: General   • OTHER SURGICAL PROCEDURE  08/2018    EGD            FAMILY PSYCHIATRIC HISTORY  Half brother bipolar    FAMILY MEDICAL HISTORY  Family History   Problem Relation Age of Onset   • Arthritis Mother    • Psychiatric Illness Mother    • Diabetes Father    • Stroke Father    • Heart Disease Father    • Psychiatric Illness Other    • Bipolar disorder Other          PHYSICAL EXAMINATION  Vital signs: There were no vitals taken for this visit.  Musculoskeletal: Gait is normal. No gross abnormalities noted.   Abnormal movements: none    MENTAL STATUS EXAMINATION    General: Cathy Spencer appears stated age and exhibits grooming which is appropriate.  Hygiene is good.     Behavior: Pt is calm and cooperative with interview.  No apparent distress.  Eye contact is appropriate.   Psychomotor: Psychomotor agitation or retardation not noted.  Tics or tremors not noted.  Speech: rate within normal limits and volume within normal limits  Language: fluent english  Mood: \"good\"  Affect: Congruent with content and euthymic,  Thought Process: Logical and Goal-directed  Thought Content: denies suicidal ideation, denies homicidal ideation. Within normal limits  Perception: denies auditory hallucinations, denies visual hallucinations. No delusions noted on interview.  Also noted on exam: none  Attention span and concentration: intact  Orientation: Alert and Fully Oriented  Recent and remote memory: No gross evidence of memory deficits  Insight: Good  Judgment: " Good    SAFETY ASSESSMENT - RISK TO SELF  · Current suicide attempts or self harm: No  · Past suicide attempts or self harm: No  · History of suicide by family member: No  · History of suicide by friend/significant other: No  · Recent change in amount/specificity/intensity of suicidal thoughts or self-harm behavior: No  · Ongoing substance use disorder: No  · Current access to firearms, medications, or other identified means of suicide/self-harm: No  · If yes, willing to restrict access to means of suicide/self-harm: N/a  · Protective factors present: Yes     SAFETY ASSESSMENT - RISK TO OTHERS  · Current aggressive behavior or risk to others: No  · Past aggressive behavior or risk to others: No  · Recent change in amount/specificity/intensity of thoughts or threats to harm others? N/a  · Current access to firearms/other identified means of harm? No  · If yes, willing to restrict access to weapons/means of harm? N/a     CURRENT RISK ASSESSMENT       Suicide: Low       Homicide: Low       Self-Harm: Low       Relapse: Not applicable       Crisis Safety Plan Reviewed Yes     [place]   reviewed.  No concerns about misuse of controlled substance.    ASSESSMENT  Cathy Spencer is a 25 y.o. old female presenting for follow up management of MDD with anxious distress, JONNY. Recently discontinued antipsychotic and started sertraline, now on 150mg daily and reports good effect on mood and anxiety. Also on buspar 20mg BID to good effect. She is agreeable to plan to gradually discontinue klonipin; currently only taking PRN a few times weekly. She will continue with her therapist. She denies suicidal thoughts, intent, or plan today. Controlled substance agreement on file.     Biological: recent weight gain  Psychological: stress of impending masters thesis  Social: chronic feeling lonely, difficulty making social connections        DIAGNOSES/PLAN  1. Moderate episode of recurrent major depressive disorder (HCC)      2. Generalized anxiety disorder     3. 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 Marijuana use, episodic       Today, will plan to continue current regimen of sertraline 150mg daily, buspar 20mg BID, and klonipin 0.5mg daily prn for no more than 3x weekly dose with plan to discontinue. She has klonipin leftover so no new Rx needed for 1 month.    • Most recent labs done on August 2021 and showed unremarkable CBC, CMP.  •   • Medication options, alternatives (including no medications) and medication risks/benefits/side effects were discussed in detail.  • The patient was advised to call, message clinician on [employer], or come in to the clinic if symptoms worsen or if questions/issues regarding their medications arise.  The patient verbalized understanding and agreement.    • The patient was educated to call 911, call the suicide hotline, or go to the local ER if having thoughts of suicide or homicide.  The patient verbalized understanding and agreement.   • The proposed treatment plan was discussed with the patient who was provided the opportunity to ask questions and make suggestions regarding alternative treatment. Patient verbalized understanding and expressed agreement with the plan.      Return to clinic in 1 month or sooner if symptoms worsen.    This appointment was supervised by attending psychiatrist, Ryan King MD, who agrees with assessment and treatment plan.  See attending attestation for more details.     No LOS data to display       Yoon Cruz M.D.  11/23/21

## 2022-01-11 ENCOUNTER — OFFICE VISIT (OUTPATIENT)
Dept: BEHAVIORAL HEALTH | Facility: PSYCHIATRIC FACILITY | Age: 27
End: 2022-01-11
Payer: COMMERCIAL

## 2022-01-11 DIAGNOSIS — F41.1 GENERALIZED ANXIETY DISORDER: ICD-10-CM

## 2022-01-11 DIAGNOSIS — F12.90 MARIJUANA USE, EPISODIC: ICD-10-CM

## 2022-01-11 DIAGNOSIS — F33.1 MODERATE EPISODE OF RECURRENT MAJOR DEPRESSIVE DISORDER (HCC): ICD-10-CM

## 2022-01-11 PROCEDURE — 99213 OFFICE O/P EST LOW 20 MIN: CPT | Mod: GE | Performed by: STUDENT IN AN ORGANIZED HEALTH CARE EDUCATION/TRAINING PROGRAM

## 2022-01-11 ASSESSMENT — ENCOUNTER SYMPTOMS
ABDOMINAL PAIN: 0
NERVOUS/ANXIOUS: 0
DEPRESSION: 1
FEVER: 0
VOMITING: 0
INSOMNIA: 0
CHILLS: 0
MUSCULOSKELETAL NEGATIVE: 1
NAUSEA: 0
SPUTUM PRODUCTION: 1
LOSS OF CONSCIOUSNESS: 0
SEIZURES: 0
HEADACHES: 0
EYES NEGATIVE: 1
SORE THROAT: 1
DIZZINESS: 0

## 2022-01-22 DIAGNOSIS — F41.1 GENERALIZED ANXIETY DISORDER: ICD-10-CM

## 2022-01-24 RX ORDER — BUSPIRONE HYDROCHLORIDE 10 MG/1
20 TABLET ORAL 2 TIMES DAILY
Qty: 120 TABLET | Refills: 0 | Status: SHIPPED | OUTPATIENT
Start: 2022-01-24 | End: 2022-02-22

## 2022-02-22 ENCOUNTER — OFFICE VISIT (OUTPATIENT)
Dept: BEHAVIORAL HEALTH | Facility: PSYCHIATRIC FACILITY | Age: 27
End: 2022-02-22
Payer: COMMERCIAL

## 2022-02-22 DIAGNOSIS — F12.90 MARIJUANA USE, CONTINUOUS: ICD-10-CM

## 2022-02-22 DIAGNOSIS — F33.1 MODERATE EPISODE OF RECURRENT MAJOR DEPRESSIVE DISORDER (HCC): ICD-10-CM

## 2022-02-22 DIAGNOSIS — F41.1 GENERALIZED ANXIETY DISORDER: ICD-10-CM

## 2022-02-22 DIAGNOSIS — F50.82 AVOIDANT-RESTRICTIVE FOOD INTAKE DISORDER (ARFID): ICD-10-CM

## 2022-02-22 PROCEDURE — 99213 OFFICE O/P EST LOW 20 MIN: CPT | Mod: GE | Performed by: STUDENT IN AN ORGANIZED HEALTH CARE EDUCATION/TRAINING PROGRAM

## 2022-02-22 RX ORDER — BUSPIRONE HYDROCHLORIDE 10 MG/1
20 TABLET ORAL 2 TIMES DAILY
Qty: 120 TABLET | Refills: 0 | Status: SHIPPED | OUTPATIENT
Start: 2022-02-22 | End: 2022-03-23

## 2022-02-22 RX ORDER — SERTRALINE HYDROCHLORIDE 100 MG/1
200 TABLET, FILM COATED ORAL
Qty: 180 TABLET | Refills: 0 | Status: SHIPPED | OUTPATIENT
Start: 2022-02-22 | End: 2022-05-17 | Stop reason: SDUPTHER

## 2022-02-22 RX ORDER — TRAZODONE HYDROCHLORIDE 50 MG/1
TABLET ORAL
Qty: 90 TABLET | Refills: 1 | Status: SHIPPED | OUTPATIENT
Start: 2022-02-22 | End: 2022-07-12 | Stop reason: SDUPTHER

## 2022-02-22 ASSESSMENT — ENCOUNTER SYMPTOMS
HEADACHES: 0
INSOMNIA: 0
EYES NEGATIVE: 1
NAUSEA: 1
CHILLS: 0
VOMITING: 1
SEIZURES: 0
LOSS OF CONSCIOUSNESS: 0
DEPRESSION: 1
RESPIRATORY NEGATIVE: 1
DIZZINESS: 0
MUSCULOSKELETAL NEGATIVE: 1
ABDOMINAL PAIN: 0
NERVOUS/ANXIOUS: 0
FEVER: 0

## 2022-02-22 NOTE — PROGRESS NOTES
[facility]  Medication Management Note    Evaluation completed by: Yoon Cruz M.D.   Date of Service: 02/22/22   Appointment type: in-office appointment.    Information below was collected from: patient    Special language or communication needs: No  Responded to any questions about patient rights: No  Reviewed limits of confidentiality: Yes  Confidentiality: The patient was informed that her medical records are confidential except for use by the treatment team in this clinic and others involved in her care.  Records may be shared with outside entities if the patient signs a release of information.  Information may be shared with appropriate authorities without a release of information to report instances of child/elder abuse or if it is determined she is in imminent risk of harm to self or others.     CHIEF COMPLAINT/REASON FOR VISIT  Med mgmt    HISTORY OF PRESENT ILLNESS  Cathy Spencer is a 26 y.o. old female who presents today for follow up management of MDD, JONNY.   Pt was last seen on 11/23/2021, at which time the plan was to continue buspar, continue sertraline, gradually taper off klonipin, work on decrease marijuana vaping. Last fall, discontinued abilify due to weight gain and ineffectiveness; at last visit weight was 230lbs up from prior weight 218lbs then 226 then today 212. She has recently increased marijuana to daily vaping. She reports using it for nausea. She recently has experienced texture based aversion to foods. She reports she eats one food type for a few months, then changes to a different food type. She was a picky eater since childhood. She frequently would choke on foods she didn't like and gag or throw up. Encouraged her to stop marijuana use as could be contributor. She was unwilling to do this because she states marijuana helps her avoid binge eating episodes. She is willing to trial sertraline 200mg in hopes of better controlling anxiety.    She  reports good mood overall. 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 She is expected to graduate spring of 2023. She reports anxiety has been manageable. She made a new friend recently. She has recently had some grief resurgence, the one year anniversary of her father's death is on 3/8. She reports good sleep, the trazodone is helpful.    Safety assessment was completed and patient is denying active suicidal ideations, plan or intent. Patient verbalized the importance of reaching out to close family/friends or calling 911 or going to nearest emergency room if any worsening of suicidal ideations or new safety concerns.      PSYCHOSOCIAL CHANGES SINCE LAST VISIT   Made a new friend in her grad program, they are getting together this week to visit a historic site    CURRENT MEDICATIONS, ADHERENCE, AND SIDE EFFECTS   Current Outpatient Medications   Medication Instructions   • busPIRone (BUSPAR) 20 mg, Oral, 2 TIMES DAILY   • clonazePAM (KLONOPIN) 0.5 mg, Oral, 1 TIME DAILY PRN   • loratadine (CLARITIN) 10 mg, Oral, DAILY   • pseudoephedrine (SUDAFED) 60 mg, Oral, 2 TIMES DAILY PRN   • sertraline (ZOLOFT) 50 MG Tab No dose, route, or frequency recorded.   • sertraline (ZOLOFT) 150 mg, Oral, EVERY DAY   • traZODone (DESYREL) 50 MG Tab TAKE 1 TABLET BY MOUTH EVERY DAY AT BEDTIME AS NEEDED FOR SLEEP          PSYCHIATRIC REVIEW OF SYSTEMS  Depression: mild anhedonia, fatigue, insomnia, distractibility  Anxiety: mild   Panic: denies   OCD: denies  PTSD: denies  Xochitl: denies  Psychosis: denies  ADHD: denies  Eating Disorders: denies recent binge eating  Autism Spectrum Disorder: denies  Sleep: denies  Behavioral: denies    MEDICAL REVIEW OF SYSTEMS  Review of Systems   Constitutional: Negative for chills and fever.   HENT: Negative.    Eyes: Negative.    Respiratory: Negative.    Cardiovascular: Negative for chest pain.   Gastrointestinal: Positive for nausea and vomiting. Negative for abdominal pain.    Genitourinary: Negative.    Musculoskeletal: Negative.    Skin: Negative for itching and rash.   Neurological: Negative for dizziness, seizures, loss of consciousness and headaches.   Endo/Heme/Allergies: Negative.    Psychiatric/Behavioral: Positive for depression. Negative for suicidal ideas. The patient is not nervous/anxious and does not have insomnia.        ALLERGIES  No Known Allergies     PAST PSYCHIATRIC HISTORY  No hx of psychiatric hospitalization  No hx of suicide attempts or self harm     SOCIAL HISTORY SUMMARY  Lives alone  In [employer] program at [employer]; mother financially supports her and is good source of social support  Father  3/8/2021      MEDICAL HISTORY  Past Medical History:  No date: Anxiety  No date: Bowel habit changes      Comment:  constipation  No date: Depression  No date: Heart burn  No date: Indigestion  2018: Pain      Comment:  back and abd., -2/10   Past Surgical History:   Procedure Laterality Date   • GABO BY LAPAROSCOPY N/A 2018    Procedure: GABO BY LAPAROSCOPY;  Surgeon: Gene Covarrubias M.D.;  Location: SURGERY [facility];  Service: General   • OTHER SURGICAL PROCEDURE  2018    EGD        FAMILY PSYCHIATRIC HISTORY  Half brother bipolar    FAMILY MEDICAL HISTORY  Family History   Problem Relation Age of Onset   • Arthritis Mother    • Psychiatric Illness Mother    • Diabetes Father    • Stroke Father    • Heart Disease Father    • Psychiatric Illness Other    • Bipolar disorder Other        PHYSICAL EXAMINATION  Vital signs: There were no vitals taken for this visit.  Musculoskeletal: Gait is normal. No gross abnormalities noted.   Abnormal movements: none    MENTAL STATUS EXAMINATION    General: Cathy Spencer appears stated age and exhibits grooming which is appropriate.  Hygiene is good.     Behavior: Pt is calm and cooperative with interview.  No apparent distress.  Eye contact is appropriate.   Psychomotor: Psychomotor agitation or  "retardation not noted.  Tics or tremors not noted.  Speech: rate within normal limits and volume within normal limits  Language: fluent English  Mood: \"good\"  Affect: Flexible, Full range, Congruent with content and Happy,  Thought Process: Logical and Goal-directed  Thought Content: denies suicidal ideation, denies homicidal ideation. Within normal limits  Perception: denies auditory hallucinations, denies visual hallucinations. No delusions noted on interview.  Also noted on exam: n/a  Attention span and concentration: intact  Orientation: Alert and Fully Oriented  Recent and remote memory: No gross evidence of memory deficits  Insight: Good  Judgment: Good    SAFETY ASSESSMENT - RISK TO SELF  • Current suicide attempts or self harm: No  • Past suicide attempts or self harm: No  • History of suicide by family member: No  • History of suicide by friend/significant other: No  • Recent change in amount/specificity/intensity of suicidal thoughts or self-harm behavior: No  • Ongoing substance use disorder: Yes, marijuana  • Current access to firearms, medications, or other identified means of suicide/self-harm: No  • If yes, willing to restrict access to means of suicide/self-harm: N/a  • Protective factors present: Yes     SAFETY ASSESSMENT - RISK TO OTHERS  • Current aggressive behavior or risk to others: No  • Past aggressive behavior or risk to others: No  • Recent change in amount/specificity/intensity of thoughts or threats to harm others? No  • Current access to firearms/other identified means of harm? No  • If yes, willing to restrict access to weapons/means of harm? N/a     CURRENT RISK ASSESSMENT       Suicide: Low       Homicide: Low       Self-Harm: Low       Relapse: Not applicable       Crisis Safety Plan Reviewed Yes    [place]  records   reviewed.  No concerns about misuse of controlled substance.    ASSESSMENT  Cathy Spencer is a 25 y.o. old female presenting for follow up management of MDD " with anxious distress, JONNY. Recently discontinued antipsychotic last fall, and started sertraline, now on 150mg daily and reports good effect on mood and anxiety. Also on buspar 20mg BID to good effect. She has been able to stop using klonipin. Anxiety remains high and she is using marijuana daily to cope; provided psychoeducation on importance of sobriety from marijuana in treating anxiety. She is agreeable to increase her sertraline to 200mg daily as well for better baseline control of anxious distress. She will continue with her therapist weekly. She denies suicidal thoughts, intent, or plan today. Controlled substance agreement on file.     Biological: Positive factors: recent weight loss (modest). Negative factors: obesity  Psychological: Positive factors: close relationship with mother, recent visit to family, future oriented in master's program. Negative factors: ARFID ongoing, approaching anniversary of father's death  Social: Positive factors: made new friend in grad school. Negative: lives alone, chronic feeling lonely    Today, will plan to INCREASE sertraline to 200mg PO daily, CONTINUE buspar 20mg PO BID, CONTINUE trazodone 50mg PO QHS PRN. Will no longer Rx klonipin as is not indicated, patient verbalized understanding and agreement.    DIAGNOSES/PLAN  1. Avoidant-restrictive food intake disorder (ARFID)     2. Generalized anxiety disorder     3. Moderate episode of recurrent major depressive disorder (HCC)     4. Marijuana use, continuous         Most recent labs done on Aug 2021 and showed unremarkable cbc, cmp.    • Medication options, alternatives (including no medications) and medication risks/benefits/side effects were discussed in detail.  • The patient was advised to call, message clinician on [employer], or come in to the clinic if symptoms worsen or if questions/issues regarding their medications arise.  The patient verbalized understanding and agreement.    • The patient was educated to call  911, call the suicide hotline, or go to the local ER if having thoughts of suicide or homicide.  The patient verbalized understanding and agreement.   • The proposed treatment plan was discussed with the patient who was provided the opportunity to ask questions and make suggestions regarding alternative treatment. Patient verbalized understanding and expressed agreement with the plan.      Return to clinic in 6 weeks or sooner if symptoms worsen.    This appointment was supervised by attending psychiatrist, Lucio Wylie MD, who agrees with assessment and treatment plan.  See attending attestation for more details.       Yoon Cruz M.D.  01/11/22

## 2022-02-28 ENCOUNTER — APPOINTMENT (OUTPATIENT)
Dept: URGENT CARE | Facility: CLINIC | Age: 27
End: 2022-02-28
Payer: COMMERCIAL

## 2022-03-22 ENCOUNTER — OFFICE VISIT (OUTPATIENT)
Dept: BEHAVIORAL HEALTH | Facility: PSYCHIATRIC FACILITY | Age: 27
End: 2022-03-22
Payer: COMMERCIAL

## 2022-03-22 DIAGNOSIS — F33.1 MODERATE EPISODE OF RECURRENT MAJOR DEPRESSIVE DISORDER (HCC): ICD-10-CM

## 2022-03-22 DIAGNOSIS — F41.1 GENERALIZED ANXIETY DISORDER: ICD-10-CM

## 2022-03-22 DIAGNOSIS — F12.90 MARIJUANA USE, CONTINUOUS: ICD-10-CM

## 2022-03-22 PROCEDURE — 99214 OFFICE O/P EST MOD 30 MIN: CPT | Performed by: STUDENT IN AN ORGANIZED HEALTH CARE EDUCATION/TRAINING PROGRAM

## 2022-03-22 ASSESSMENT — ENCOUNTER SYMPTOMS
NERVOUS/ANXIOUS: 0
SEIZURES: 0
EYES NEGATIVE: 1
FEVER: 0
INSOMNIA: 0
LOSS OF CONSCIOUSNESS: 0
DIZZINESS: 0
RESPIRATORY NEGATIVE: 1
HEADACHES: 0
MUSCULOSKELETAL NEGATIVE: 1
ABDOMINAL PAIN: 0
DEPRESSION: 1
CHILLS: 0

## 2022-03-22 NOTE — PROGRESS NOTES
[facility]  Medication Management Note    Evaluation completed by: Yoon Cruz M.D.   Date of Service: 03/22/22   Appointment type: in-office appointment.    Information below was collected from: patient    Special language or communication needs: No  Responded to any questions about patient rights: No  Reviewed limits of confidentiality: Yes  Confidentiality: The patient was informed that her medical records are confidential except for use by the treatment team in this clinic and others involved in her care.  Records may be shared with outside entities if the patient signs a release of information.  Information may be shared with appropriate authorities without a release of information to report instances of child/elder abuse or if it is determined she is in imminent risk of harm to self or others.     CHIEF COMPLAINT/REASON FOR VISIT  Med mgmt    HISTORY OF PRESENT ILLNESS  Cathy Spencer is a 26 y.o. old female who presents today for follow up management of MDD, JONNY.   Pt was last seen on 11/23/2021, at which time the plan was to continue buspar, INCREASED sertraline to 200mg daily, gradually taper off klonipin, work on decrease marijuana vaping. Last fall, discontinued abilify due to weight gain and ineffectiveness; at last visit weight was 212, today it is 206. She reports she vomits her medication if she doesn't take it with food. She reports good overall mood and low anxiety. Her sleep is restorative and regular. Her appetite is good.     She continues vaping marijuana daily. She reports using it for reported lack of appetite and/or nausea from taking medications on empty stomach. She states the downsides are brain fog, short term memory loss, and difficulty focusing. She rates her interest in quitting or reducing use as 3/5, with 5/5 being totally committed. She demonstrates some lack of insight into downfalls of continued daily use.     She has about 7 pills of  dominic left. Discussed benefits of discontinuation and reiterated that we will not be refilling it going forward.    Safety assessment was completed and patient is denying active suicidal ideations, plan or intent. Patient verbalized the importance of reaching out to close family/friends or calling 911 or going to nearest emergency room if any worsening of suicidal ideations or new safety concerns.      PSYCHOSOCIAL CHANGES SINCE LAST VISIT   Her mother is visiting from out of town, offers good support since her father  last year    CURRENT MEDICATIONS, ADHERENCE, AND SIDE EFFECTS   Current Outpatient Medications   Medication Instructions   • busPIRone (BUSPAR) 20 mg, Oral, 2 TIMES DAILY   • clonazePAM (KLONOPIN) 0.5 mg, Oral, 1 TIME DAILY PRN   • loratadine (CLARITIN) 10 mg, Oral, DAILY   • pseudoephedrine (SUDAFED) 60 mg, Oral, 2 TIMES DAILY PRN   • sertraline (ZOLOFT) 50 MG Tab No dose, route, or frequency recorded.   • sertraline (ZOLOFT) 150 mg, Oral, EVERY DAY   • traZODone (DESYREL) 50 MG Tab TAKE 1 TABLET BY MOUTH EVERY DAY AT BEDTIME AS NEEDED FOR SLEEP          PSYCHIATRIC REVIEW OF SYSTEMS  Depression: mild anhedonia, fatigue, insomnia, distractibility  Anxiety: mild   Panic: denies   OCD: denies  PTSD: denies  Xochitl: denies  Psychosis: denies  ADHD: denies  Eating Disorders: denies recent binge eating  Autism Spectrum Disorder: denies  Sleep: denies  Behavioral: denies    MEDICAL REVIEW OF SYSTEMS  Review of Systems   Constitutional: Negative for chills and fever.   HENT: Negative.    Eyes: Negative.    Respiratory: Negative.    Cardiovascular: Negative for chest pain.   Gastrointestinal: Negative for abdominal pain.   Genitourinary: Negative.    Musculoskeletal: Negative.    Skin: Negative for itching and rash.   Neurological: Negative for dizziness, seizures, loss of consciousness and headaches.   Endo/Heme/Allergies: Negative.    Psychiatric/Behavioral: Positive for depression. Negative for  "suicidal ideas. The patient is not nervous/anxious and does not have insomnia.        ALLERGIES  No Known Allergies     PAST PSYCHIATRIC HISTORY  No hx of psychiatric hospitalization  No hx of suicide attempts or self harm     SOCIAL HISTORY SUMMARY  Lives alone  In masters program at [employer]; mother financially supports her and is good source of social support  Father  3/8/2021      MEDICAL HISTORY  Past Medical History:  No date: Anxiety  No date: Bowel habit changes      Comment:  constipation  No date: Depression  No date: Heart burn  No date: Indigestion  2018: Pain      Comment:  back and abd., -2/10   Past Surgical History:   Procedure Laterality Date   • GABO BY LAPAROSCOPY N/A 2018    Procedure: GABO BY LAPAROSCOPY;  Surgeon: Gene Covarrubias M.D.;  Location: SURGERY [facility];  Service: General   • OTHER SURGICAL PROCEDURE  2018    EGD        FAMILY PSYCHIATRIC HISTORY  Half brother bipolar    FAMILY MEDICAL HISTORY  Family History   Problem Relation Age of Onset   • Arthritis Mother    • Psychiatric Illness Mother    • Diabetes Father    • Stroke Father    • Heart Disease Father    • Psychiatric Illness Other    • Bipolar disorder Other        PHYSICAL EXAMINATION  Vital signs: There were no vitals taken for this visit.  Musculoskeletal: Gait is normal. No gross abnormalities noted.   Abnormal movements: none    MENTAL STATUS EXAMINATION    General: Cathy Spencer appears stated age and exhibits grooming which is appropriate.  Hygiene is good.     Behavior: Pt is calm and cooperative with interview.  No apparent distress.  Eye contact is appropriate.   Psychomotor: Psychomotor agitation or retardation not noted.  Tics or tremors not noted.  Speech: rate within normal limits and volume within normal limits  Language: fluent English  Mood: \"good\"  Affect: Flexible, Full range, Congruent with content and Happy,  Thought Process: Logical and Goal-directed  Thought Content: " denies suicidal ideation, denies homicidal ideation. Within normal limits  Perception: denies auditory hallucinations, denies visual hallucinations. No delusions noted on interview.  Also noted on exam: n/a  Attention span and concentration: intact  Orientation: Alert and Fully Oriented  Recent and remote memory: No gross evidence of memory deficits  Insight: Good  Judgment: Good    SAFETY ASSESSMENT - RISK TO SELF  • Current suicide attempts or self harm: No  • Past suicide attempts or self harm: No  • History of suicide by family member: No  • History of suicide by friend/significant other: No  • Recent change in amount/specificity/intensity of suicidal thoughts or self-harm behavior: No  • Ongoing substance use disorder: Yes, marijuana  • Current access to firearms, medications, or other identified means of suicide/self-harm: No  • If yes, willing to restrict access to means of suicide/self-harm: N/a  • Protective factors present: Yes     SAFETY ASSESSMENT - RISK TO OTHERS  • Current aggressive behavior or risk to others: No  • Past aggressive behavior or risk to others: No  • Recent change in amount/specificity/intensity of thoughts or threats to harm others? No  • Current access to firearms/other identified means of harm? No  • If yes, willing to restrict access to weapons/means of harm? N/a     CURRENT RISK ASSESSMENT       Suicide: Low       Homicide: Low       Self-Harm: Low       Relapse: Not applicable       Crisis Safety Plan Reviewed Yes    [place]   reviewed.  No concerns about misuse of controlled substance.    ASSESSMENT  Cathy Spencer is a 25 y.o. old female presenting for follow up management of MDD with anxious distress, JONNY. Recently discontinued antipsychotic last fall, and started sertraline, now on 200mg daily and reports good effect on mood and anxiety. Also on buspar 20mg BID and trazodone 50mg QHS PRN. She has been able to stop using klonipin. Discussed role of daily  marijuana vaping as potential barrier to recovery from anxiety, she expressed ambivalence about decreasing use. She will continue with her therapist weekly. She denies suicidal thoughts, intent, or plan today.     Biological: Positive factors: recent weight loss (modest). Negative factors: obesity  Psychological: Positive factors: close relationship with mother, recent visit to family, future oriented in master's program. Negative factors: psychological rigidity around substance use  Social: Positive factors: made new friend in grad school. Negative: lives alone, chronic feeling lonely    Today, will plan continue sertraline to 200mg PO daily, CONTINUE buspar 20mg PO BID, CONTINUE trazodone 50mg PO QHS PRN. Will no longer Rx klonipin as is not indicated, patient verbalized understanding and agreement.    DIAGNOSES/PLAN  1. Generalized anxiety disorder     2. Moderate episode of recurrent major depressive disorder (HCC)     3. Marijuana use, continuous         Most recent labs done on Aug 2021 and showed unremarkable cbc, cmp.    • Medication options, alternatives (including no medications) and medication risks/benefits/side effects were discussed in detail.  • The patient was advised to call, message clinician on [employer], or come in to the clinic if symptoms worsen or if questions/issues regarding their medications arise.  The patient verbalized understanding and agreement.    • The patient was educated to call 911, call the suicide hotline, or go to the local ER if having thoughts of suicide or homicide.  The patient verbalized understanding and agreement.   • The proposed treatment plan was discussed with the patient who was provided the opportunity to ask questions and make suggestions regarding alternative treatment. Patient verbalized understanding and expressed agreement with the plan.      Return to clinic in 6 weeks or sooner if symptoms worsen.    This appointment was supervised by attending psychiatrist,  Lucio Wylie MD, who agrees with assessment and treatment plan.  See attending attestation for more details.       Yoon Cruz M.D.  01/11/22

## 2022-03-23 DIAGNOSIS — F41.1 GENERALIZED ANXIETY DISORDER: ICD-10-CM

## 2022-03-23 RX ORDER — BUSPIRONE HYDROCHLORIDE 10 MG/1
20 TABLET ORAL 2 TIMES DAILY
Qty: 120 TABLET | Refills: 0 | Status: SHIPPED | OUTPATIENT
Start: 2022-03-23 | End: 2022-04-20

## 2022-04-19 ENCOUNTER — OFFICE VISIT (OUTPATIENT)
Dept: BEHAVIORAL HEALTH | Facility: PSYCHIATRIC FACILITY | Age: 27
End: 2022-04-19
Payer: COMMERCIAL

## 2022-04-19 DIAGNOSIS — F33.1 MODERATE EPISODE OF RECURRENT MAJOR DEPRESSIVE DISORDER (HCC): ICD-10-CM

## 2022-04-19 DIAGNOSIS — F12.90 MARIJUANA USE, CONTINUOUS: ICD-10-CM

## 2022-04-19 DIAGNOSIS — F41.1 GENERALIZED ANXIETY DISORDER: ICD-10-CM

## 2022-04-19 PROCEDURE — 99214 OFFICE O/P EST MOD 30 MIN: CPT | Performed by: STUDENT IN AN ORGANIZED HEALTH CARE EDUCATION/TRAINING PROGRAM

## 2022-04-19 ASSESSMENT — ENCOUNTER SYMPTOMS
DEPRESSION: 1
NERVOUS/ANXIOUS: 0
RESPIRATORY NEGATIVE: 1
SEIZURES: 0
CHILLS: 0
EYES NEGATIVE: 1
FEVER: 0
MUSCULOSKELETAL NEGATIVE: 1
LOSS OF CONSCIOUSNESS: 0
INSOMNIA: 0
HEADACHES: 0
ABDOMINAL PAIN: 0
DIZZINESS: 0

## 2022-04-19 ASSESSMENT — PATIENT HEALTH QUESTIONNAIRE - PHQ9
SUM OF ALL RESPONSES TO PHQ QUESTIONS 1-9: 9
CLINICAL INTERPRETATION OF PHQ2 SCORE: 1
5. POOR APPETITE OR OVEREATING: 1 - SEVERAL DAYS

## 2022-04-19 NOTE — PROGRESS NOTES
"[facility]  Medication Management Note    Evaluation completed by: Yoon Cruz M.D.   Date of Service: 4/19/22   Appointment type: in-office appointment.    Information below was collected from: patient    Special language or communication needs: No  Responded to any questions about patient rights: No  Reviewed limits of confidentiality: Yes  Confidentiality: The patient was informed that her medical records are confidential except for use by the treatment team in this clinic and others involved in her care.  Records may be shared with outside entities if the patient signs a release of information.  Information may be shared with appropriate authorities without a release of information to report instances of child/elder abuse or if it is determined she is in imminent risk of harm to self or others.     CHIEF COMPLAINT/REASON FOR VISIT  Med mgmt    HISTORY OF PRESENT ILLNESS  Cathy Spencer is a 26 y.o. old female who presents today for follow up management of MDD, JONNY.   Pt was last seen March 2022, at which time the plan was to continue buspar, trazodone, sertraline, gradually taper off klonipin, work on decrease marijuana vaping. Last fall, discontinued abilify due to weight gain and ineffectiveness. She reports good overall mood and low anxiety. Her sleep is restorative and regular. Her appetite is good. She continues episodic marijuana use.    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 She is excited and future oriented about her project.    Safety assessment was completed and patient is denying active suicidal ideations, plan or intent. Patient verbalized the importance of reaching out to close family/friends or calling 911 or going to nearest emergency room if any worsening of suicidal ideations or new safety concerns.      PSYCHOSOCIAL CHANGES " SINCE LAST VISIT   None    CURRENT MEDICATIONS, ADHERENCE, AND SIDE EFFECTS   Current Outpatient Medications   Medication Instructions   • busPIRone (BUSPAR) 20 mg, Oral, 2 TIMES DAILY   • loratadine (CLARITIN) 10 mg, Oral, DAILY   • pseudoephedrine (SUDAFED) 60 mg, Oral, 2 TIMES DAILY PRN   • sertraline (ZOLOFT) 200 mg, Oral, EVERY DAY   • traZODone (DESYREL) 50 MG Tab TAKE 1 TABLET BY MOUTH AT BEDTIME AS NEEDED FOR SLEEP       PSYCHIATRIC REVIEW OF SYSTEMS  Depression: mild anhedonia, fatigue, insomnia, distractibility  Anxiety: mild   Panic: denies   OCD: denies  PTSD: denies  Xochitl: denies  Psychosis: denies  ADHD: denies  Eating Disorders: denies recent binge eating  Autism Spectrum Disorder: denies  Sleep: denies  Behavioral: denies    MEDICAL REVIEW OF SYSTEMS  Review of Systems   Constitutional: Negative for chills and fever.   HENT: Negative.    Eyes: Negative.    Respiratory: Negative.    Cardiovascular: Negative for chest pain.   Gastrointestinal: Negative for abdominal pain.   Genitourinary: Negative.    Musculoskeletal: Negative.    Skin: Negative for itching and rash.   Neurological: Negative for dizziness, seizures, loss of consciousness and headaches.   Endo/Heme/Allergies: Negative.    Psychiatric/Behavioral: Positive for depression. Negative for suicidal ideas. The patient is not nervous/anxious and does not have insomnia.        ALLERGIES  No Known Allergies     PAST PSYCHIATRIC HISTORY  No hx of psychiatric hospitalization  No hx of suicide attempts or self harm     SOCIAL HISTORY SUMMARY  Lives alone  In masters program at [employer]; mother financially supports her and is good source of social support  Father  3/8/2021      MEDICAL HISTORY  Past Medical History:  No date: Anxiety  No date: Bowel habit changes      Comment:  constipation  No date: Depression  No date: Heart burn  No date: Indigestion  2018: Pain      Comment:  back and abd., 1-2/10   Past Surgical History:   Procedure  "Laterality Date   • GABO BY LAPAROSCOPY N/A 12/26/2018    Procedure: GABO BY LAPAROSCOPY;  Surgeon: Gene Covarrubias M.D.;  Location: SURGERY [facility];  Service: General   • OTHER SURGICAL PROCEDURE  08/2018    EGD        FAMILY PSYCHIATRIC HISTORY  Half brother bipolar    FAMILY MEDICAL HISTORY  Family History   Problem Relation Age of Onset   • Arthritis Mother    • Psychiatric Illness Mother    • Diabetes Father    • Stroke Father    • Heart Disease Father    • Psychiatric Illness Other    • Bipolar disorder Other        PHYSICAL EXAMINATION  Vital signs: There were no vitals taken for this visit.  Musculoskeletal: Gait is normal. No gross abnormalities noted.   Abnormal movements: none    MENTAL STATUS EXAMINATION    General: Cathy Spencer appears stated age and exhibits grooming which is appropriate.  Hygiene is good.     Behavior: Pt is calm and cooperative with interview.  No apparent distress.  Eye contact is appropriate.   Psychomotor: Psychomotor agitation or retardation not noted.  Tics or tremors not noted.  Speech: rate within normal limits and volume within normal limits  Language: fluent English  Mood: \"good\"  Affect: Flexible, Full range, Congruent with content and Happy,  Thought Process: Logical and Goal-directed  Thought Content: denies suicidal ideation, denies homicidal ideation. Within normal limits  Perception: denies auditory hallucinations, denies visual hallucinations. No delusions noted on interview.  Also noted on exam: n/a  Attention span and concentration: intact  Orientation: Alert and Fully Oriented  Recent and remote memory: No gross evidence of memory deficits  Insight: Good  Judgment: Good    SAFETY ASSESSMENT - RISK TO SELF  • Current suicide attempts or self harm: No  • Past suicide attempts or self harm: No  • History of suicide by family member: No  • History of suicide by friend/significant other: No  • Recent change in amount/specificity/intensity of " suicidal thoughts or self-harm behavior: No  • Ongoing substance use disorder: Yes, marijuana  • Current access to firearms, medications, or other identified means of suicide/self-harm: No  • If yes, willing to restrict access to means of suicide/self-harm: N/a  • Protective factors present: Yes     SAFETY ASSESSMENT - RISK TO OTHERS  • Current aggressive behavior or risk to others: No  • Past aggressive behavior or risk to others: No  • Recent change in amount/specificity/intensity of thoughts or threats to harm others? No  • Current access to firearms/other identified means of harm? No  • If yes, willing to restrict access to weapons/means of harm? N/a     CURRENT RISK ASSESSMENT       Suicide: Low       Homicide: Low       Self-Harm: Low       Relapse: Not applicable       Crisis Safety Plan Reviewed Yes    [place]  records   reviewed.  No concerns about misuse of controlled substance.    ASSESSMENT  Cathy Spencer is a 25 y.o. old female presenting for follow up management of MDD with anxious distress, JONNY. Recently discontinued antipsychotic last fall, and started sertraline, now on 200mg daily and reports good effect on mood and anxiety. Also on buspar 20mg BID and trazodone 50mg QHS PRN. She has been able to stop using klonipin. Discussed role of daily marijuana vaping as potential barrier to recovery from anxiety, she expressed ambivalence about decreasing use. She will continue with her therapist weekly. She denies suicidal thoughts, intent, or plan today.     Biological: Positive factors: recent weight loss (modest). Negative factors: obesity  Psychological: Positive factors: close relationship with mother, recent visit to family, future oriented in master's program. Negative factors: psychological rigidity around substance use  Social: Positive factors: made new friend in grad school. Negative: lives alone, chronic feeling lonely    Today, will plan continue sertraline to 200mg PO daily,  CONTINUE buspar 20mg PO BID, CONTINUE trazodone 50mg PO QHS PRN. Will no longer Rx klonipin as is not indicated, patient verbalized understanding and agreement.    DIAGNOSES/PLAN  1. Generalized anxiety disorder     2. Moderate episode of recurrent major depressive disorder (HCC)     3. Marijuana use, continuous         Most recent labs done on Aug 2021 and showed unremarkable cbc, cmp.    • Medication options, alternatives (including no medications) and medication risks/benefits/side effects were discussed in detail.  • The patient was advised to call, message clinician on [employer], or come in to the clinic if symptoms worsen or if questions/issues regarding their medications arise.  The patient verbalized understanding and agreement.    • The patient was educated to call 911, call the suicide hotline, or go to the local ER if having thoughts of suicide or homicide.  The patient verbalized understanding and agreement.   • The proposed treatment plan was discussed with the patient who was provided the opportunity to ask questions and make suggestions regarding alternative treatment. Patient verbalized understanding and expressed agreement with the plan.      Return to clinic in 6 weeks or sooner if symptoms worsen.    This appointment was supervised by attending psychiatrist, Lucio Wylie MD, who agrees with assessment and treatment plan.  See attending attestation for more details.       Yoon Cruz M.D.  01/11/22

## 2022-04-20 DIAGNOSIS — F41.1 GENERALIZED ANXIETY DISORDER: ICD-10-CM

## 2022-04-20 RX ORDER — BUSPIRONE HYDROCHLORIDE 10 MG/1
20 TABLET ORAL 2 TIMES DAILY
Qty: 120 TABLET | Refills: 0 | Status: SHIPPED | OUTPATIENT
Start: 2022-04-20 | End: 2022-05-23

## 2022-05-17 ENCOUNTER — OFFICE VISIT (OUTPATIENT)
Dept: BEHAVIORAL HEALTH | Facility: PSYCHIATRIC FACILITY | Age: 27
End: 2022-05-17
Payer: COMMERCIAL

## 2022-05-17 DIAGNOSIS — F33.1 MODERATE EPISODE OF RECURRENT MAJOR DEPRESSIVE DISORDER (HCC): ICD-10-CM

## 2022-05-17 DIAGNOSIS — F41.1 GENERALIZED ANXIETY DISORDER: ICD-10-CM

## 2022-05-17 PROCEDURE — 99213 OFFICE O/P EST LOW 20 MIN: CPT | Mod: GE | Performed by: STUDENT IN AN ORGANIZED HEALTH CARE EDUCATION/TRAINING PROGRAM

## 2022-05-17 RX ORDER — SERTRALINE HYDROCHLORIDE 100 MG/1
200 TABLET, FILM COATED ORAL
Qty: 180 TABLET | Refills: 0 | Status: SHIPPED | OUTPATIENT
Start: 2022-05-17 | End: 2022-07-12 | Stop reason: SDUPTHER

## 2022-05-17 ASSESSMENT — ENCOUNTER SYMPTOMS
NERVOUS/ANXIOUS: 0
RESPIRATORY NEGATIVE: 1
LOSS OF CONSCIOUSNESS: 0
SEIZURES: 0
DEPRESSION: 1
ABDOMINAL PAIN: 0
INSOMNIA: 0
EYES NEGATIVE: 1
HEADACHES: 0
FEVER: 0
CHILLS: 0
MUSCULOSKELETAL NEGATIVE: 1
DIZZINESS: 0

## 2022-05-17 NOTE — PROGRESS NOTES
"[facility]  Medication Management Note    Evaluation completed by: Yoon Cruz M.D.   Date of Service: 5/17/22   Appointment type: in-office appointment.    Information below was collected from: patient    Special language or communication needs: No  Responded to any questions about patient rights: No  Reviewed limits of confidentiality: Yes  Confidentiality: The patient was informed that her medical records are confidential except for use by the treatment team in this clinic and others involved in her care.  Records may be shared with outside entities if the patient signs a release of information.  Information may be shared with appropriate authorities without a release of information to report instances of child/elder abuse or if it is determined she is in imminent risk of harm to self or others.     CHIEF COMPLAINT/REASON FOR VISIT  Med mgmt    HISTORY OF PRESENT ILLNESS  Cathy Spencer is a 26 y.o. old female who presents today for follow up management of MDD, JONNY.   Pt was last seen March 2022, at which time the plan was to continue buspar, trazodone, sertraline, gradually taper off klonipin, work on decrease marijuana vaping. Last fall, discontinued abilify due to weight gain and ineffectiveness. She reports good overall mood and low anxiety. Her sleep is restorative and regular. Her appetite is good. She continues episodic marijuana use for chronic back pain and headaches. She does go to PT for this. Today she reports wanting to decrease use to once per week.    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 She passed all her classes with A's and is done with the classroom portion of her master's degree. 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 She is excited and future oriented about her project. She may get a part time job to add more structure to her routine.    Safety assessment was " completed and patient is denying active suicidal ideations, plan or intent. Patient verbalized the importance of reaching out to close family/friends or calling 911 or going to nearest emergency room if any worsening of suicidal ideations or new safety concerns.    PSYCHOSOCIAL CHANGES SINCE LAST VISIT   Still seeing her therapist    CURRENT MEDICATIONS, ADHERENCE, AND SIDE EFFECTS   Current Outpatient Medications   Medication Instructions   • busPIRone (BUSPAR) 20 mg, Oral, 2 TIMES DAILY   • loratadine (CLARITIN) 10 mg, Oral, DAILY   • pseudoephedrine (SUDAFED) 60 mg, Oral, 2 TIMES DAILY PRN   • sertraline (ZOLOFT) 200 mg, Oral, EVERY DAY   • traZODone (DESYREL) 50 MG Tab TAKE 1 TABLET BY MOUTH AT BEDTIME AS NEEDED FOR SLEEP       PSYCHIATRIC REVIEW OF SYSTEMS  Depression: mild anhedonia, fatigue, insomnia, distractibility  Anxiety: mild   Panic: denies   OCD: denies  PTSD: denies  Xochitl: denies  Psychosis: denies  ADHD: denies  Eating Disorders: denies recent binge eating  Autism Spectrum Disorder: denies  Sleep: denies  Behavioral: denies    MEDICAL REVIEW OF SYSTEMS  Review of Systems   Constitutional: Negative for chills and fever.   HENT: Negative.    Eyes: Negative.    Respiratory: Negative.    Cardiovascular: Negative for chest pain.   Gastrointestinal: Negative for abdominal pain.   Genitourinary: Negative.    Musculoskeletal: Negative.    Skin: Negative for itching and rash.   Neurological: Negative for dizziness, seizures, loss of consciousness and headaches.   Endo/Heme/Allergies: Negative.    Psychiatric/Behavioral: Positive for depression. Negative for suicidal ideas. The patient is not nervous/anxious and does not have insomnia.        ALLERGIES  No Known Allergies     PAST PSYCHIATRIC HISTORY  No hx of psychiatric hospitalization  No hx of suicide attempts or self harm     SOCIAL HISTORY SUMMARY  Lives alone  In masters program at [employer]; mother financially supports her and is good source of social  "support  Father  3/8/2021      MEDICAL HISTORY  Past Medical History:  No date: Anxiety  No date: Bowel habit changes      Comment:  constipation  No date: Depression  No date: Heart burn  No date: Indigestion  2018: Pain      Comment:  back and abd., 1-2/10   Past Surgical History:   Procedure Laterality Date   • GABO BY LAPAROSCOPY N/A 2018    Procedure: GABO BY LAPAROSCOPY;  Surgeon: Gene Covarrubias M.D.;  Location: SURGERY [facility];  Service: General   • OTHER SURGICAL PROCEDURE  2018    EGD        FAMILY PSYCHIATRIC HISTORY  Half brother bipolar    FAMILY MEDICAL HISTORY  Family History   Problem Relation Age of Onset   • Arthritis Mother    • Psychiatric Illness Mother    • Diabetes Father    • Stroke Father    • Heart Disease Father    • Psychiatric Illness Other    • Bipolar disorder Other        PHYSICAL EXAMINATION  Vital signs: There were no vitals taken for this visit.  Musculoskeletal: Gait is normal. No gross abnormalities noted.   Abnormal movements: none    MENTAL STATUS EXAMINATION    General: Cathy Spencer appears stated age and exhibits grooming which is appropriate.  Hygiene is good.     Behavior: Pt is calm and cooperative with interview.  No apparent distress.  Eye contact is appropriate.   Psychomotor: Psychomotor agitation or retardation not noted.  Tics or tremors not noted.  Speech: rate within normal limits and volume within normal limits  Language: fluent English  Mood: \"good\"  Affect: Flexible, Full range, Congruent with content and Happy,  Thought Process: Logical and Goal-directed  Thought Content: denies suicidal ideation, denies homicidal ideation. Within normal limits  Perception: denies auditory hallucinations, denies visual hallucinations. No delusions noted on interview.  Also noted on exam: n/a  Attention span and concentration: intact  Orientation: Alert and Fully Oriented  Recent and remote memory: No gross evidence of memory " deficits  Insight: Good  Judgment: Good    SAFETY ASSESSMENT - RISK TO SELF  • Current suicide attempts or self harm: No  • Past suicide attempts or self harm: No  • History of suicide by family member: No  • History of suicide by friend/significant other: No  • Recent change in amount/specificity/intensity of suicidal thoughts or self-harm behavior: No  • Ongoing substance use disorder: Yes, marijuana  • Current access to firearms, medications, or other identified means of suicide/self-harm: No  • If yes, willing to restrict access to means of suicide/self-harm: N/a  • Protective factors present: Yes     SAFETY ASSESSMENT - RISK TO OTHERS  • Current aggressive behavior or risk to others: No  • Past aggressive behavior or risk to others: No  • Recent change in amount/specificity/intensity of thoughts or threats to harm others? No  • Current access to firearms/other identified means of harm? No  • If yes, willing to restrict access to weapons/means of harm? N/a     CURRENT RISK ASSESSMENT       Suicide: Low       Homicide: Low       Self-Harm: Low       Relapse: Not applicable       Crisis Safety Plan Reviewed Yes    [place]   reviewed.  No concerns about misuse of controlled substance.    ASSESSMENT  Cathy Spencer is a 26 y.o. old female presenting for follow up management of MDD with anxious distress, JONNY. Recently discontinued antipsychotic last fall, and started sertraline, now on 200mg daily and reports good effect on mood and anxiety. Also on buspar 20mg BID and trazodone 50mg QHS PRN. She has been able to stop using klonipin. Discussed role of daily marijuana vaping as potential barrier to recovery from anxiety, she expressed ambivalence about decreasing use. She will continue with her therapist weekly. She denies suicidal thoughts, intent, or plan today.     Biological: Positive factors: recent weight loss (modest). Negative factors: obesity  Psychological: Positive factors: close  relationship with mother, recent visit to family, future oriented in master's program. Negative factors: psychological rigidity around substance use  Social: Positive factors: made new friend in grad school. Negative: lives alone, chronic feeling lonely    Today, will plan continue sertraline to 200mg PO daily, CONTINUE buspar 20mg PO BID, CONTINUE trazodone 50mg PO QHS PRN. Will no longer Rx klonipin as is not indicated, patient verbalized understanding and agreement.    DIAGNOSES/PLAN  1. Generalized anxiety disorder  sertraline (ZOLOFT) 100 MG Tab   2. Moderate episode of recurrent major depressive disorder (HCC)  sertraline (ZOLOFT) 100 MG Tab       Most recent labs done on Aug 2021 and showed unremarkable cbc, cmp.    • Medication options, alternatives (including no medications) and medication risks/benefits/side effects were discussed in detail.  • The patient was advised to call, message clinician on [employer], or come in to the clinic if symptoms worsen or if questions/issues regarding their medications arise.  The patient verbalized understanding and agreement.    • The patient was educated to call 911, call the suicide hotline, or go to the local ER if having thoughts of suicide or homicide.  The patient verbalized understanding and agreement.   • The proposed treatment plan was discussed with the patient who was provided the opportunity to ask questions and make suggestions regarding alternative treatment. Patient verbalized understanding and expressed agreement with the plan.      Return to clinic in 6 weeks or sooner if symptoms worsen.    This appointment was supervised by attending psychiatrist, Lucio Wylie MD, who agrees with assessment and treatment plan.  See attending attestation for more details.       Yoon Cruz M.D.  01/11/22

## 2022-05-21 DIAGNOSIS — F41.1 GENERALIZED ANXIETY DISORDER: ICD-10-CM

## 2022-05-23 RX ORDER — BUSPIRONE HYDROCHLORIDE 10 MG/1
20 TABLET ORAL 2 TIMES DAILY
Qty: 120 TABLET | Refills: 0 | Status: SHIPPED | OUTPATIENT
Start: 2022-05-23 | End: 2022-06-28

## 2022-06-22 DIAGNOSIS — F41.1 GENERALIZED ANXIETY DISORDER: ICD-10-CM

## 2022-06-28 RX ORDER — BUSPIRONE HYDROCHLORIDE 10 MG/1
20 TABLET ORAL 2 TIMES DAILY
Qty: 120 TABLET | Refills: 0 | Status: SHIPPED | OUTPATIENT
Start: 2022-06-28 | End: 2022-07-12 | Stop reason: SDUPTHER

## 2022-07-12 ENCOUNTER — OFFICE VISIT (OUTPATIENT)
Dept: BEHAVIORAL HEALTH | Facility: PSYCHIATRIC FACILITY | Age: 27
End: 2022-07-12
Payer: COMMERCIAL

## 2022-07-12 VITALS
BODY MASS INDEX: 29.03 KG/M2 | DIASTOLIC BLOOD PRESSURE: 65 MMHG | SYSTOLIC BLOOD PRESSURE: 123 MMHG | WEIGHT: 185 LBS | HEIGHT: 67 IN | HEART RATE: 71 BPM | OXYGEN SATURATION: 95 %

## 2022-07-12 DIAGNOSIS — F33.1 MODERATE EPISODE OF RECURRENT MAJOR DEPRESSIVE DISORDER (HCC): ICD-10-CM

## 2022-07-12 DIAGNOSIS — F41.1 GENERALIZED ANXIETY DISORDER: ICD-10-CM

## 2022-07-12 PROCEDURE — 99213 OFFICE O/P EST LOW 20 MIN: CPT | Mod: GE

## 2022-07-12 RX ORDER — TRAZODONE HYDROCHLORIDE 50 MG/1
50 TABLET ORAL
Qty: 90 TABLET | Refills: 1 | Status: SHIPPED | OUTPATIENT
Start: 2022-07-12 | End: 2023-03-21

## 2022-07-12 RX ORDER — SERTRALINE HYDROCHLORIDE 100 MG/1
200 TABLET, FILM COATED ORAL
Qty: 180 TABLET | Refills: 0 | Status: SHIPPED | OUTPATIENT
Start: 2022-07-12 | End: 2022-11-04 | Stop reason: SDUPTHER

## 2022-07-12 RX ORDER — BUSPIRONE HYDROCHLORIDE 10 MG/1
20 TABLET ORAL 2 TIMES DAILY
Qty: 120 TABLET | Refills: 0 | Status: SHIPPED | OUTPATIENT
Start: 2022-07-12 | End: 2022-08-11

## 2022-07-12 ASSESSMENT — ENCOUNTER SYMPTOMS
CARDIOVASCULAR NEGATIVE: 1
MUSCULOSKELETAL NEGATIVE: 1
GASTROINTESTINAL NEGATIVE: 1
CONSTITUTIONAL NEGATIVE: 1
RESPIRATORY NEGATIVE: 1
NEUROLOGICAL NEGATIVE: 1
EYES NEGATIVE: 1

## 2022-07-12 NOTE — PROGRESS NOTES
"[facility] Psychiatric Evaluation     Evaluation completed by: Jourdan Antonio D.O.   Date of Service: 07/12/22   Appointment type: in-office appointment.    Information below was collected from: patient    Special language or communication needs: No  Responded to any questions about patient rights: No  Reviewed limits of confidentiality: Yes  Confidentiality: The patient was informed that her medical records are confidential except for use by the treatment team in this clinic and others involved in her care.  Records may be shared with outside entities if the patient signs a release of information.  Information may be shared with appropriate authorities without a release of information to report instances of child/elder abuse or if it is determined she is in imminent risk of harm to self or others.     CHIEF COMPLAINT  \"My anxiety is easier to manage\"    HISTORY OF PRESENT ILLNESS  Cathy Spencer is a 26 y.o. old female who presents today for extended follow up appointment for transition to new physician for assessment of anxiety and depression. She states that her symptoms have been largely under control and easier to manage when they resurface. Pt reports doing well on her current regimin and cites no issues when taking her medication. Last week was especially difficult as she had her mother visiting from [place] and she wanted things to run smoothly. She cites an incident in which they drove a few hours out of town to a camp site but did not have their reservation ready. However, Pt states that she was able to manage her feelings, something which she would have had issues doing in the past. 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 Pt reports that her depression has been under control for the past couple of years. She states that it spiked at that time due to a change in focus of her master's program in " addition to a falling out with a friend. However, since resolution of those stressful events, her depression has been under control. She uses trazodone once a week as a sleep aid. She also endorses a change in her energy level but does note that it may be due to lack of eating. Her appetite has been decreased but states that this is something she has been dealing with for quite some time. She has been noted to be restrictive in food intake in the past. Pt has a new dog, Maria Fernanda, which has helped her step outside her comfort zone and provided a daily routine for her to follow. The dog has allowed her to meet some people through dog perera which has helped. Overall, she believes that she is doing well and does not note any concerns with the medications that she is on.     PSYCHIATRIC REVIEW OF SYSTEMS  Depression: well maintained; no overt feelings of depression  Anxiety: Minor anxiety which has been controlled well. No major stressors at this time. Situational events that occur out of her control which cause her anxiety but she is able to work through her feelings.  Panic: panic attack about once a week but she is able to recognize the physical manifestations and deal with them  OCD: no  PTSD: no  Xochitl: no  Psychosis: no AVH, paranoia or delusions  ADHD: no  Eating Disorders: no  Autism Spectrum Disorder: no  Sleep: about 9 hours of sleep a day; uses trazodone 50 mg about once a week as a sleep aid  Behavioral: no    MEDICAL REVIEW OF SYSTEMS  Review of Systems   Constitutional: Negative.    HENT: Positive for congestion.    Eyes: Negative.    Respiratory: Negative.    Cardiovascular: Negative.    Gastrointestinal: Negative.    Musculoskeletal: Negative.    Skin: Negative.    Neurological: Negative.        CURRENT MEDICATIONS  Zoloft 200 mg qdaily for depression / anxiety  Buspar 20 mg bid for anxiety  Trazodone 50 mg PRN for insomnia    ALLERGIES  No Known Allergies     PAST PSYCHIATRIC HISTORY  Pt with first  psychiatric contact at age 13. No psychiatric hospitalizations. No past suicide attempts. Does engage in self harm behavior in which she slaps her head or scratches herself. She states that this is an attempt to regain control of the situation. She does not intend to cause great damage. She sees a therapist once a week which she has found to be efficacious.     Diagnoses: Depression and anxiety     Self Harm/Suicide Attempts: no suicide attempts; self harm behavior described above    Past Hospitalizations:  Dates Location Reason   N/a N/a                                         Past Outpatient Treatment:  Dates Location/Clinician Outcome   Last appointment at [facility] clinic on 5/17 with Dr. Cruz. [facility] clinic Has responded to medication well; switching providers                                        Past Psychiatric Medications:  Medication/Dose(s) Dates Reason for Discontinuation   Klonopin 0.5  Anxiety                                           SOCIAL HISTORY  Childhood: Born in [place] and describes childhood as good. Grew up as an only child with older half-siblings. Moved to [place] to get away from overbearing family.  Education: currently in master's program for anthropology  Employment: master's program; does some consulting work  Relationships/Family: close with mother; father passed away last year. Only child. 2 years ago had a falling out with close friend which she considered a sister  Current living situation: lives alone with dog, Maria Fernanda  Legal: no  Abuse: no  : no  Spirituality/Anglican: no organized Anglican    SUBSTANCE USE HISTORY  Alcohol: no  Tobacco: no  Cannabis: daily marijuana use, uses cartridges.   Opioids: no  Prescription medications: no  Other: no  History of inpatient/outpatient rehab treatment: no    MEDICAL HISTORY  Past Medical History:  No date: Anxiety  No date: Bowel habit changes      Comment:  constipation  No date: Depression  No date: Heart burn  No date:  "Indigestion  12/21/2018: Pain      Comment:  back and abd., 1-2/10   Past Surgical History:   Procedure Laterality Date   • GABO BY LAPAROSCOPY N/A 12/26/2018    Procedure: GABO BY LAPAROSCOPY;  Surgeon: Gene Covarrubias M.D.;  Location: SURGERY [facility];  Service: General   • OTHER SURGICAL PROCEDURE  08/2018    EGD      Cardiac arrhythmias: denies  Thyroid disease: denies  Diabetes: denies  Seizures: denies  Head injury/TBI: denies    FAMILY PSYCHIATRIC HISTORY  Psychiatric diagnoses: mother has depression on SSRI  History of suicide attempts:  No  History of incarceration: No  Substance use history:  No    FAMILY MEDICAL HISTORY  Cardiac arrhythmias: grandfather had cardiac disease; father had cardiomegaly with open heart surgery  Sudden cardiac death: denies  Thyroid disease: denies  Seizure history: denies    PHYSICAL EXAMINATION  Vital signs: /65 (BP Location: Left arm, Patient Position: Sitting, BP Cuff Size: Adult)   Pulse 71   Ht 1.702 m (5' 7\")   Wt 83.9 kg (185 lb)   SpO2 95%   BMI 28.98 kg/m²   Musculoskeletal: Gait is normal. No gross abnormalities noted.   Abnormal movements: non noted    MENTAL STATUS EXAMINATION    General: Cathy Spencer appears stated age and exhibits grooming which is appropriate.  Hygiene is good..     Behavior: Pt is calm and cooperative with interview.  No acute apparent distress.  Eye contact is appropriate.   Psychomotor: No psychomotor agitation or retardation noted. No tics or tremors noted.  Speech: rate within normal limits and volume within normal limits  Language: clear, coherent  Mood: \"good\"  Affect: Full range and Congruent with content, Euthymic  Thought Process: Logical and Goal-directed  Thought Content: denies suicidal ideation, denies homicidal ideation. Within normal limits  Perception: denies auditory hallucinations, denies visual hallucinations. No delusions noted on interview.    Attention span and concentration: " good  Orientation: Alert and Fully Oriented  Recent and remote memory: No gross evidence of memory deficits  Insight: Good  Judgment: Good    SAFETY ASSESSMENT - RISK TO SELF  • Current suicide attempts or self harm: No  • Past suicide attempts or self harm: No  • History of suicide by family member: No  • History of suicide by friend/significant other: No  • Recent change in amount/specificity/intensity of suicidal thoughts or self-harm behavior: No  • Ongoing substance use disorder: Yes  • Current access to firearms, medications, or other identified means of suicide/self-harm: No  • If yes, willing to restrict access to means of suicide/self-harm: N/a  • Protective factors present: Yes     SAFETY ASSESSMENT - RISK TO OTHERS  • Current aggressive behavior or risk to others: No  • Past aggressive behavior or risk to others: No  • Recent change in amount/specificity/intensity of thoughts or threats to harm others? No  • Current access to firearms/other identified means of harm? No  • If yes, willing to restrict access to weapons/means of harm? N/a     CURRENT RISK ASSESSMENT       Suicide: Low       Homicide: Low       Self-Harm: Low       Relapse: Low       Crisis Safety Plan Reviewed Yes    [place]  records  Not indicated    ASSESSMENT  Cathy Spencer is a 26 y.o. old female presenting for extended follow up appointment for new provider with main concerns of anxiety and depression. Pt states that her symptoms have been largely controlled with her current medication regimen. She continues to have breakthrough anxiety but has learned to cope with these feelings through strategies learned in therapy and with being more in tune with her body / mind.     Biological: mother with history of depression - predisposition to developing depression. Pt in tune with physical manifestations of panic attacks - able to recognize signs and let it pass.  Psychological: perfectionist; Pt struggles with not feeling in  control  Social: lack of social support but has been improving - Pt has a new dog, Maria Fernanda and has met people at the dog park. Good relationship with mother.     Today, will plan to continue current medication of: Zoloft 200 mg, Buspirone 20 mg bid, and Trazodone 50 mg PRN and follow up in two months.     DIAGNOSES/PLAN  Problem 1: Generalized Anxiety Disorder  • Medications: Zoloft 200 mg qdaily, Buspirone 20 mg bid  • Psychotherapy: undergoing therapy weekly    Problem 2: Moderate episode of Major Depressive Disorder  • Medications: Zoloft 200 mg qdaily, Buspirone 20 mg bid  • Psychotherapy: undergoing weekly therapy     Recommended decreasing marijuana use; Pt understands risks of continued use    • Medication options, alternatives (including no medications) and medication risks/benefits/side effects were discussed in detail.  • The patient was advised to call, message clinician on [employer], or come in to the clinic if symptoms worsen or if questions/issues regarding their medications arise.  The patient verbalized understanding and agreement.    • The patient was educated to call 911, call the suicide hotline, or go to the local ER if having thoughts of suicide or homicide.  The patient verbalized understanding and agreement.   • The proposed treatment plan was discussed with the patient who was provided the opportunity to ask questions and make suggestions regarding alternative treatment. Patient verbalized understanding and expressed agreement with the plan.      Return to clinic in two months or sooner if symptoms worsen.    This appointment was supervised by attending psychiatrist, Cathy Lockhart MD, who agrees with assessment and treatment plan.  See attending attestation for more details.       Jourdan Antonio D.O.  07/12/22

## 2022-09-06 ENCOUNTER — OFFICE VISIT (OUTPATIENT)
Dept: BEHAVIORAL HEALTH | Facility: PSYCHIATRIC FACILITY | Age: 27
End: 2022-09-06

## 2022-09-06 VITALS
HEART RATE: 70 BPM | DIASTOLIC BLOOD PRESSURE: 70 MMHG | HEIGHT: 66 IN | SYSTOLIC BLOOD PRESSURE: 123 MMHG | OXYGEN SATURATION: 97 % | BODY MASS INDEX: 29.77 KG/M2 | WEIGHT: 185.2 LBS

## 2022-09-06 DIAGNOSIS — F41.1 GENERALIZED ANXIETY DISORDER: ICD-10-CM

## 2022-09-06 PROCEDURE — 99213 OFFICE O/P EST LOW 20 MIN: CPT | Mod: GE

## 2022-09-06 ASSESSMENT — ENCOUNTER SYMPTOMS
NERVOUS/ANXIOUS: 1
GASTROINTESTINAL NEGATIVE: 1
CONSTITUTIONAL NEGATIVE: 1
EYES NEGATIVE: 1
CARDIOVASCULAR NEGATIVE: 1
RESPIRATORY NEGATIVE: 1
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
## 2022-10-25 ENCOUNTER — OFFICE VISIT (OUTPATIENT)
Dept: BEHAVIORAL HEALTH | Facility: PSYCHIATRIC FACILITY | Age: 27
End: 2022-10-25
Payer: COMMERCIAL

## 2022-10-25 VITALS
WEIGHT: 183.8 LBS | DIASTOLIC BLOOD PRESSURE: 93 MMHG | HEIGHT: 66 IN | OXYGEN SATURATION: 97 % | SYSTOLIC BLOOD PRESSURE: 121 MMHG | BODY MASS INDEX: 29.54 KG/M2 | HEART RATE: 67 BPM

## 2022-10-25 DIAGNOSIS — F41.1 GENERALIZED ANXIETY DISORDER: ICD-10-CM

## 2022-10-25 PROCEDURE — 90792 PSYCH DIAG EVAL W/MED SRVCS: CPT | Mod: GC

## 2022-10-25 RX ORDER — BUSPIRONE HYDROCHLORIDE 30 MG/1
30 TABLET ORAL 2 TIMES DAILY
Qty: 60 TABLET | Refills: 2 | Status: SHIPPED | OUTPATIENT
Start: 2022-10-25 | End: 2023-01-10 | Stop reason: SDUPTHER

## 2022-10-25 ASSESSMENT — ENCOUNTER SYMPTOMS
RESPIRATORY NEGATIVE: 1
EYES NEGATIVE: 1
GASTROINTESTINAL NEGATIVE: 1
CONSTITUTIONAL NEGATIVE: 1
CARDIOVASCULAR NEGATIVE: 1
NERVOUS/ANXIOUS: 1

## 2022-10-25 NOTE — PROGRESS NOTES
[facility]  Medication Management Note    Evaluation completed by: Jourdan Antonio D.O.   Date of Service: 10/25/22   Appointment type: in-office appointment.    Information below was collected from: patient    Special language or communication needs: No  Responded to any questions about patient rights: No  Reviewed limits of confidentiality: Yes  Confidentiality: The patient was informed that her medical records are confidential except for use by the treatment team in this clinic and others involved in her care.  Records may be shared with outside entities if the patient signs a release of information.  Information may be shared with appropriate authorities without a release of information to report instances of child/elder abuse or if it is determined she is in imminent risk of harm to self or others.     CHIEF COMPLAINT/REASON FOR VISIT  Follow up anxiety     HISTORY OF PRESENT ILLNESS  Cathy Spencer is a 26 y.o. old female who presents today for follow up management of generalized anxiety disorder.  Pt was last seen on 7/12/22, at which time the plan was  to continue current medication of: Zoloft 200 mg, Buspirone 20 mg bid, and Trazodone 50 mg PRN.     Reports that her anxiety has continued to be an issue but has been largely manageable. She takes buspar around 9 in the morning and 9 in the evening. Pt has noticed an improvement in her anxiety with the medication. She has been working on coping with her anxiety through mindfulness in therapy ie being more aware of her reactions in addition to learning grounding techniques. She does state that she had important meetings in the time since our last appointment which were stressful in nature. Following these meetings, she did experience intermittent and intrusive passive SI which dissipated in a week's time. Reports that she would not act on these thoughts. She denies current SI or HI.    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 Denies psychomotor retardation. Denies Suicidal ideation.   Anxiety: increased anxiety. Endorses muscle tension, headaches and constant rumination.   Panic: panic attack a couple of weeks ago  PTSD: no flashbacks, nightmares, hypervigilance or avoidant behavior regarding past trauma or abuse.   Xochitl: denies recent period of distractibility, impulsivity, grandiosity, flight of ideas, increased activity, not needing sleeping  Sleep: 8 hours of sleep a night; reports vivid dreams  Psychosis: no AVH, paranoia or delusions     MEDICAL REVIEW OF SYSTEMS  Review of Systems   Constitutional: Negative.    HENT: Negative.     Eyes: Negative.    Respiratory: Negative.     Cardiovascular: Negative.    Gastrointestinal: Negative.    Psychiatric/Behavioral:  The patient is nervous/anxious.      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;  Service: General    OTHER SURGICAL PROCEDURE  08/2018    EGD       PHYSICAL EXAMINATION  Vital signs: There were no vitals  "taken for this visit.  Musculoskeletal: Gait is normal. No gross abnormalities noted.   Abnormal movements: none    MENTAL STATUS EXAMINATION    General: Cathy Spencer appears stated age and exhibits grooming which is appropriate, casual, and neat.  Hygiene is good.     Behavior: Pt is calm and cooperative with interview.  No apparent distress.  Eye contact is appropriate.   Psychomotor: Psychomotor agitation or retardation not noted.  Tics or tremors not noted.  Speech: rate within normal limits and volume within normal limits  Language: fluent english  Mood: \"okay\"  Affect: Flexible, Full range, Congruent with content, and Anxious,  Thought Process: Logical and Goal-directed  Thought Content: denies suicidal ideation, denies homicidal ideation. Within normal limits  Perception: denies auditory hallucinations, denies visual hallucinations. No delusions noted on interview.   Attention span and concentration: good  Orientation: Alert and Fully Oriented  Recent and remote memory: No gross evidence of memory deficits  Insight: Good  Judgment: Good    SAFETY ASSESSMENT - RISK TO SELF  Current suicide attempts or self harm: No  Past suicide attempts or self harm: No  History of suicide by family member: No  History of suicide by friend/significant other: No  Recent change in amount/specificity/intensity of suicidal thoughts or self-harm behavior: Yes but no longer endorsing SI.  Ongoing substance use disorder: No  Current access to firearms, medications, or other identified means of suicide/self-harm: No  If yes, willing to restrict access to means of suicide/self-harm: N/a  Protective factors present: Yes     SAFETY ASSESSMENT - RISK TO OTHERS  Current aggressive behavior or risk to others: No  Past aggressive behavior or risk to others: No  Recent change in amount/specificity/intensity of thoughts or threats to harm others? No  Current access to firearms/other identified means of harm? No  If yes, willing to " restrict access to weapons/means of harm? N/a     CURRENT RISK ASSESSMENT       Suicide: Low       Homicide: Low       Self-Harm: Low       Relapse: Low    ASSESSMENT  Cathy Spencer is a 26 y.o. old female presenting for follow up appointment for anxiety and depression. She does report increased anxiety since last visit but has been improving. Will increase Buspar to 30 mg bid to provide additional therapeutic benefit.      Biological: mother with history of depression - predisposition to developing depression. Pt in tune with physical manifestations of panic attacks - able to recognize signs and let it pass.  Psychological: perfectionist; Pt struggles with not feeling in control  Social: lack of social support but has been improving - Pt has a dog, [employer] and has met people at the dog park. Good relationship with mother.      Today, will plan to continue current medication of: Zoloft 200 mg, Buspirone 20 mg bid, and Trazodone 50 mg PRN and follow up in one month. Instructed her on adherence with buspar, and that she does not need to take the two doses 12 hours apart.     DIAGNOSES/PLAN  Problem 1: Generalized Anxiety Disorder  Status: Active/Worsening  Medications: Zoloft 200 mg. Increased Buspar to 30 mg bid. Consider addition of propranolol in the future for PRN use.  Psychotherapy: undergoing weekly therapy     Medication options, alternatives (including no medications) and medication risks/benefits/side effects were discussed in detail.  The patient was advised to call, message clinician on [employer], or come in to the clinic if symptoms worsen or if questions/issues regarding their medications arise.  The patient verbalized understanding and agreement.    The patient was educated to call 911, call the suicide hotline, or go to the local ER if having thoughts of suicide or homicide.  The patient verbalized understanding and agreement.   The proposed treatment plan was discussed with the patient who was  provided the opportunity to ask questions and make suggestions regarding alternative treatment. Patient verbalized understanding and expressed agreement with the plan.      Return to clinic in one month or sooner if symptoms worsen.    This appointment was supervised by attending psychiatrist, Dr. Lucio Wylie MD, who agrees with assessment and treatment plan. See attending attestation for more details.     Jourdan Antonio D.O.  10/25/22

## 2022-11-02 ENCOUNTER — APPOINTMENT (OUTPATIENT)
Dept: RADIOLOGY | Facility: IMAGING CENTER | Age: 27
End: 2022-11-02
Attending: STUDENT IN AN ORGANIZED HEALTH CARE EDUCATION/TRAINING PROGRAM
Payer: COMMERCIAL

## 2022-11-02 ENCOUNTER — OFFICE VISIT (OUTPATIENT)
Dept: URGENT CARE | Facility: CLINIC | Age: 27
End: 2022-11-02
Payer: COMMERCIAL

## 2022-11-02 VITALS
TEMPERATURE: 97.6 F | OXYGEN SATURATION: 98 % | DIASTOLIC BLOOD PRESSURE: 70 MMHG | HEART RATE: 80 BPM | SYSTOLIC BLOOD PRESSURE: 110 MMHG | HEIGHT: 66 IN | WEIGHT: 180 LBS | BODY MASS INDEX: 28.93 KG/M2 | RESPIRATION RATE: 16 BRPM

## 2022-11-02 DIAGNOSIS — M79.601 PAIN OF RIGHT UPPER EXTREMITY: ICD-10-CM

## 2022-11-02 PROCEDURE — 73060 X-RAY EXAM OF HUMERUS: CPT | Mod: TC,RT | Performed by: STUDENT IN AN ORGANIZED HEALTH CARE EDUCATION/TRAINING PROGRAM

## 2022-11-02 PROCEDURE — 99203 OFFICE O/P NEW LOW 30 MIN: CPT | Performed by: STUDENT IN AN ORGANIZED HEALTH CARE EDUCATION/TRAINING PROGRAM

## 2022-11-02 NOTE — PROGRESS NOTES
Subjective:   CHIEF COMPLAINT  Chief Complaint   Patient presents with    Arm Injury     X 2 days, Rt. upper arm injury after falling.  Pain is getting worse       HPI  Cathy Spencer is a 26 y.o. female who presents with a chief complaint of right arm pain status post ground-level, mechanical fall 2 days ago.  She points to the posterior margins of the humerus as source of discomfort.  Symptoms are aggravated with full extension of the elbow.  Also says she has pain when her dog pulls on the leash.  She has tried taking Advil which is provided limited relief of symptoms.  Says her hand also feels weak with gripping but denies any radicular pain from the location of injury to her hands.  She did not hit her C-spine.      REVIEW OF SYSTEMS  General: no fever or chills  GI: no nausea or vomiting  See HPI for further details.    PAST MEDICAL HISTORY   has a past medical history of Anxiety, Bowel habit changes, Depression, Heart burn, Indigestion, and Pain (12/21/2018).    SURGICAL HISTORY   has a past surgical history that includes other surgical procedure (08/2018) and di by laparoscopy (N/A, 12/26/2018).    ALLERGIES  No Known Allergies    CURRENT MEDICATIONS  Home Medications    **Home medications have not yet been reviewed for this encounter**         SOCIAL HISTORY  Social History     Tobacco Use    Smoking status: Never    Smokeless tobacco: Never   Vaping Use    Vaping Use: Not on file   Substance and Sexual Activity    Alcohol use: Yes     Comment: 2/month    Drug use: Yes     Types: Marijuana, Inhaled, Oral     Comment: marijuana last used 12/23    Sexual activity: Not on file       FAMILY HISTORY  Family History   Problem Relation Age of Onset    Arthritis Mother     Psychiatric Illness Mother     Diabetes Father     Stroke Father     Heart Disease Father     Psychiatric Illness Other     Bipolar disorder Other           Objective:   PHYSICAL EXAM  VITAL SIGNS: /70 (BP Location: Left arm,  "Patient Position: Sitting, BP Cuff Size: Large adult)   Pulse 80   Temp 36.4 °C (97.6 °F) (Temporal)   Resp 16   Ht 1.676 m (5' 6\")   Wt 81.6 kg (180 lb)   SpO2 98%   BMI 29.05 kg/m²     Gen: no acute distress, normal voice  Psych: normal affect, normal judgement, alert, awake  Skin: dry, intact, moist mucosal membranes  Lungs: CTAB w/ symmetric expansion  CV: RRR w/o murmurs or clicks  MSK: Right arm no erythema, ecchymosis or edema.  Full AROM of shoulder and elbow associated with mild discernible discomfort along the posterior, middle one third of the humerus.  Mild tenderness palpation along the posterior middle one third of the humerus without any step-off, crepitus or hematoma.  Full strength of rotator cuff with negative empty can and liftoff signs.  Full strength and sensation on the distal median, ulnar and radial nerves.      RADIOLOGY RESULTS   DX-HUMERUS 2+ RIGHT    Result Date: 11/2/2022 11/2/2022 2:33 PM HISTORY/REASON FOR EXAM:  Pain/Deformity Following Trauma TECHNIQUE/EXAM DESCRIPTION AND NUMBER OF VIEWS: 2 of the right humerus COMPARISON: None. FINDINGS: There is normal bony mineralization.  There is no evidence of fracture, dislocation, or osseous lesion.  There is no evidence of soft tissue injury.     1.  No radiographic evidence of acute injury.           Assessment/Plan:     1. Pain of right upper extremity  DX-HUMERUS 2+ RIGHT      Secondary to contusion.  X-rays negative for any acute osseous abnormalities.  Recommended light activity as tolerated.  Symptomatic treatment with Motrin and Tylenol. Return to urgent care any new/worsening symptoms or further questions or concerns.  Patient understood everything discussed.  All questions were answered.      Differential diagnosis, natural history, supportive care, and indications for immediate follow-up discussed. All questions answered. Patient agrees with the plan of care.    Follow-up as needed if symptoms worsen or fail to improve to " PCP, Urgent care or Emergency Room.    Please note that this dictation was created using voice recognition software. I have made a reasonable attempt to correct obvious errors, but I expect that there are errors of grammar and possibly content that I did not discover before finalizing the note.

## 2022-11-04 DIAGNOSIS — F33.1 MODERATE EPISODE OF RECURRENT MAJOR DEPRESSIVE DISORDER (HCC): ICD-10-CM

## 2022-11-04 DIAGNOSIS — F41.1 GENERALIZED ANXIETY DISORDER: ICD-10-CM

## 2022-11-04 RX ORDER — SERTRALINE HYDROCHLORIDE 100 MG/1
200 TABLET, FILM COATED ORAL
Qty: 60 TABLET | Refills: 2 | Status: SHIPPED | OUTPATIENT
Start: 2022-11-04 | End: 2023-01-10 | Stop reason: SDUPTHER

## 2022-11-29 ENCOUNTER — OFFICE VISIT (OUTPATIENT)
Dept: BEHAVIORAL HEALTH | Facility: PSYCHIATRIC FACILITY | Age: 27
End: 2022-11-29
Payer: COMMERCIAL

## 2022-11-29 VITALS
DIASTOLIC BLOOD PRESSURE: 70 MMHG | HEART RATE: 93 BPM | OXYGEN SATURATION: 96 % | BODY MASS INDEX: 30.18 KG/M2 | WEIGHT: 187.8 LBS | HEIGHT: 66 IN | SYSTOLIC BLOOD PRESSURE: 114 MMHG

## 2022-11-29 DIAGNOSIS — F41.1 GENERALIZED ANXIETY DISORDER: ICD-10-CM

## 2022-11-29 DIAGNOSIS — F33.1 MODERATE EPISODE OF RECURRENT MAJOR DEPRESSIVE DISORDER (HCC): ICD-10-CM

## 2022-11-29 PROCEDURE — 99213 OFFICE O/P EST LOW 20 MIN: CPT | Mod: GE

## 2022-11-29 ASSESSMENT — ENCOUNTER SYMPTOMS
EYES NEGATIVE: 1
RESPIRATORY NEGATIVE: 1
GASTROINTESTINAL NEGATIVE: 1
NERVOUS/ANXIOUS: 1
CARDIOVASCULAR NEGATIVE: 1
CONSTITUTIONAL NEGATIVE: 1

## 2022-11-29 NOTE — PROGRESS NOTES
[facility]  Medication Management Note    Evaluation completed by: Jourdan Antonio D.O.   Date of Service: 11/29/22   Appointment type: in-office appointment.    Information below was collected from: patient    Special language or communication needs: No  Responded to any questions about patient rights: No  Reviewed limits of confidentiality: Yes  Confidentiality: The patient was informed that her medical records are confidential except for use by the treatment team in this clinic and others involved in her care.  Records may be shared with outside entities if the patient signs a release of information.  Information may be shared with appropriate authorities without a release of information to report instances of child/elder abuse or if it is determined she is in imminent risk of harm to self or others.     CHIEF COMPLAINT/REASON FOR VISIT  Follow up anxiety     HISTORY OF PRESENT ILLNESS  Cathy Spencer is a 26 y.o. old female who presents today for follow up management of generalized anxiety disorder.  Pt was last seen on 11/04/22, at which time the plan was to increase Buspirone dose to 30 mg bid and to continue 200 mg dose of Zoloft.     Pt reports that her anxiety has continued to be present but has been largely manageable. She reports that she has been increasing her productivity. Pt has noticed a difference in intensity of anxiety after increasing her dose of Buspar. She has been engaging in self-care to decrease breakthrough anxiety. Pt specifically mentions learning when to step away from a stressor, and then revisit the situation when her intense emotions have dissipated. Current stressors include: anxiety with thesis and driving home to [place] with all her pets during the holidays. She has noticed greater irritability but does report that it has not been significant and overall manageable. Pt has been taking Buspar at 9 am and 9 pm. She reports vivid dreams most nights.  This has been an issue in the past and has been alleviated with Trazodone. She does have some reservations with Trazodone as it typically makes her feel groggy in the morning.     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 Denies psychomotor retardation. Denies Suicidal ideation.   Anxiety: anxiety present with muscle tension, headaches and constant rumination.   Panic: denies panic attacks in the last month  PTSD: no flashbacks, nightmares, hypervigilance or avoidant behavior regarding past trauma or abuse.   Xochitl: denies recent period of distractibility, impulsivity, grandiosity, flight of ideas, increased activity, not needing sleeping  Sleep: 8 hours of sleep a night; reports vivid dreams  Psychosis: no AVH, paranoia or delusions     MEDICAL REVIEW OF SYSTEMS  Review of Systems   Constitutional: Negative.    HENT: Negative.     Eyes: Negative.    Respiratory: Negative.     Cardiovascular: Negative.    Gastrointestinal: Negative.    Psychiatric/Behavioral:  The patient is nervous/anxious.      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[employer], currently working on thesis    MEDICAL HISTORY  Past Medical History:  No date: Anxiety  No date: Bowel habit changes      Comment:  constipation  No date: Depression  No date: Heart burn  No date: Indigestion  12/21/2018: Pain      Comment:  back and abd., 1-2/10   Past Surgical History:   Procedure Laterality Date    GABO BY LAPAROSCOPY N/A 12/26/2018    Procedure: GABO BY LAPAROSCOPY;  Surgeon: Gene JOHANSEN  "TIFFANY Covarrubias;  Location: SURGERY [facility];  Service: General    OTHER SURGICAL PROCEDURE  08/2018    EGD       PHYSICAL EXAMINATION  Vital signs: There were no vitals taken for this visit.  Musculoskeletal: Gait is normal. No gross abnormalities noted.   Abnormal movements: none    MENTAL STATUS EXAMINATION    General: Cathy Spencer appears stated age and exhibits grooming which is appropriate, casual, and neat.  Hygiene is good.     Behavior: Pt is calm and cooperative with interview.  No apparent distress.  Eye contact is appropriate.   Psychomotor: Psychomotor agitation or retardation not noted.  Tics or tremors not noted.  Speech: rate within normal limits and volume within normal limits  Language: fluent english  Mood: \"good\"  Affect: Flexible, Full range, Congruent with content, and Anxious,  Thought Process: Logical and Goal-directed  Thought Content: denies suicidal ideation, denies homicidal ideation. Within normal limits  Perception: denies auditory hallucinations, denies visual hallucinations. No delusions noted on interview.   Attention span and concentration: good  Orientation: Alert and Fully Oriented  Recent and remote memory: No gross evidence of memory deficits  Insight: Good  Judgment: Good    SAFETY ASSESSMENT - RISK TO SELF  Current suicide attempts or self harm: No  Past suicide attempts or self harm: No  History of suicide by family member: No  History of suicide by friend/significant other: No  Recent change in amount/specificity/intensity of suicidal thoughts or self-harm behavior: Yes but no longer endorsing SI.  Ongoing substance use disorder: No  Current access to firearms, medications, or other identified means of suicide/self-harm: No  If yes, willing to restrict access to means of suicide/self-harm: N/a  Protective factors present: Yes     SAFETY ASSESSMENT - RISK TO OTHERS  Current aggressive behavior or risk to others: No  Past aggressive behavior or risk to others: " No  Recent change in amount/specificity/intensity of thoughts or threats to harm others? No  Current access to firearms/other identified means of harm? No  If yes, willing to restrict access to weapons/means of harm? N/a     CURRENT RISK ASSESSMENT       Suicide: Low       Homicide: Low       Self-Harm: Low       Relapse: Low    ASSESSMENT  Cathy Spencer is a 26 y.o. old female presenting for follow up appointment for anxiety and depression. She does report continued anxiety since last visit but has noted an improvement with the increase of Buspar to 30 mg bid. She has been working on coping mechanisms. Pt does endorse vivid dreams most nights. Contrary to known side effects, she does report a previous improvement in this symptom on Trazodone in the past. However, it has caused grogginess in the mornings and is hesitant to restart the medication. Given that she is on high doses of both Zoloft and Buspar, there is consideration that increased serotonin levels are causing the vivid dreams. Did  patient's on avoiding other medication which acts on serotonergic receptors ie triptans for migraines. Plan to switch 9 pm dose of Buspar to the afternoon (2 pm) in an attempt to provide anxiety coverage but potentially have lower serotonin levels at bedtime.      Biological: mother with history of depression - predisposition to developing depression. Pt in tune with physical manifestations of panic attacks - able to recognize signs and let it pass.  Psychological: perfectionist; Pt struggles with not feeling in control  Social: lack of social support but has been improving - Pt has a dog, [employer] and has met people at the dog park. Good relationship with mother. Has been making more attempts at communicating with classmates.      Today, will plan to continue current medication of: Zoloft 200 mg, and Buspirone 30 mg bid - switch timing of Buspirone to 9 am and 2 pm.     DIAGNOSES/PLAN  Problem 1: Generalized  Anxiety Disorder  Status: Active/Worsening  Medications: Zoloft 200 mg. Increased Buspar to 30 mg bid. Switch timing of Buspirone to 9 am and 2 pm to alleviate effects of vivid dreams. Consider addition of propranolol in the future for PRN use.  Psychotherapy: undergoing weekly therapy     Medication options, alternatives (including no medications) and medication risks/benefits/side effects were discussed in detail.  The patient was advised to call, message clinician on [employer], or come in to the clinic if symptoms worsen or if questions/issues regarding their medications arise.  The patient verbalized understanding and agreement.    The patient was educated to call 911, call the suicide hotline, or go to the local ER if having thoughts of suicide or homicide.  The patient verbalized understanding and agreement.   The proposed treatment plan was discussed with the patient who was provided the opportunity to ask questions and make suggestions regarding alternative treatment. Patient verbalized understanding and expressed agreement with the plan.      Return to clinic in one month or sooner if symptoms worsen.    This appointment was supervised by attending psychiatrist, Dr. Cathy Lockhart MD, who agrees with assessment and treatment plan. See attending attestation for more details.     Jourdan Antonio D.O.  11/29/22

## 2023-01-10 ENCOUNTER — OFFICE VISIT (OUTPATIENT)
Dept: BEHAVIORAL HEALTH | Facility: PSYCHIATRIC FACILITY | Age: 28
End: 2023-01-10

## 2023-01-10 VITALS
HEART RATE: 97 BPM | DIASTOLIC BLOOD PRESSURE: 80 MMHG | SYSTOLIC BLOOD PRESSURE: 120 MMHG | BODY MASS INDEX: 30.76 KG/M2 | OXYGEN SATURATION: 96 % | WEIGHT: 191.4 LBS | HEIGHT: 66 IN

## 2023-01-10 DIAGNOSIS — F33.1 MODERATE EPISODE OF RECURRENT MAJOR DEPRESSIVE DISORDER (HCC): ICD-10-CM

## 2023-01-10 DIAGNOSIS — F41.1 GENERALIZED ANXIETY DISORDER: ICD-10-CM

## 2023-01-10 PROCEDURE — 99213 OFFICE O/P EST LOW 20 MIN: CPT | Mod: GE

## 2023-01-10 RX ORDER — SERTRALINE HYDROCHLORIDE 100 MG/1
200 TABLET, FILM COATED ORAL
Qty: 60 TABLET | Refills: 2 | Status: SHIPPED | OUTPATIENT
Start: 2023-01-30 | End: 2023-03-21 | Stop reason: SDUPTHER

## 2023-01-10 RX ORDER — BUSPIRONE HYDROCHLORIDE 30 MG/1
30 TABLET ORAL 2 TIMES DAILY
Qty: 60 TABLET | Refills: 2 | Status: SHIPPED | OUTPATIENT
Start: 2023-01-30 | End: 2023-01-26 | Stop reason: SDUPTHER

## 2023-01-10 ASSESSMENT — ENCOUNTER SYMPTOMS
NERVOUS/ANXIOUS: 1
CONSTITUTIONAL NEGATIVE: 1
RESPIRATORY NEGATIVE: 1
EYES NEGATIVE: 1
CARDIOVASCULAR NEGATIVE: 1
GASTROINTESTINAL NEGATIVE: 1

## 2023-01-10 NOTE — PROGRESS NOTES
[facility]  Medication Management Note    Evaluation completed by: Jourdan Antonio D.O.   Date of Service: 01/10/23   Appointment type: in-office appointment.    Information below was collected from: patient    Special language or communication needs: No  Responded to any questions about patient rights: No  Reviewed limits of confidentiality: Yes  Confidentiality: The patient was informed that her medical records are confidential except for use by the treatment team in this clinic and others involved in her care.  Records may be shared with outside entities if the patient signs a release of information.  Information may be shared with appropriate authorities without a release of information to report instances of child/elder abuse or if it is determined she is in imminent risk of harm to self or others.     CHIEF COMPLAINT/REASON FOR VISIT  Follow up anxiety     HISTORY OF PRESENT ILLNESS  Cathy Spencer is a 26 y.o. old female who presents today for follow up management of generalized anxiety disorder. Pt was last seen on 11/29/22, at which time the plan was to switch the timing of the twice daily Buspirone 30 mg to 9 am and 2 pm to minimize high levels of serotonin at bedtime thus decreasing vivid dreams. She was also instructed to continue the 200 mg dose of Zoloft.     Pt reports that while her anxiety has been present, she has been managing. She cites traveling to her parents in [place] as a stressful event in the last month. Future events which are anxiety-provoking include the writing of her thesis and the defence of it. Her vivid dreams have decreased in frequency, reporting that they only occurred in the past month during periods of increased stress. Pt is okay with her current regimen of medication. She denies SI or HI. She has no questions or concerns at this time.     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 Denies psychomotor retardation. Denies Suicidal ideation.   Anxiety: anxiety present with muscle tension, headaches and constant rumination however this has seen improvement.   Panic: one panic attack in the last month  PTSD: no flashbacks, nightmares, hypervigilance or avoidant behavior regarding past trauma or abuse.   Xochitl: denies recent period of distractibility, impulsivity, grandiosity, flight of ideas, increased activity, not needing sleeping  Sleep: 8 hours of sleep a night; reports vivid dreams  Psychosis: no AVH, paranoia or delusions     MEDICAL REVIEW OF SYSTEMS  Review of Systems   Constitutional: Negative.    HENT: Negative.     Eyes: Negative.    Respiratory: Negative.     Cardiovascular: Negative.    Gastrointestinal: Negative.    Psychiatric/Behavioral:  The patient is nervous/anxious.      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 [employer], currently working on thesis    MEDICAL HISTORY  Past Medical History:  No date: Anxiety  No date: Bowel habit changes      Comment:  constipation  No date: Depression  No date: Heart burn  No date: Indigestion  12/21/2018: Pain      Comment:  back and abd., 1-2/10   Past Surgical History:   Procedure Laterality Date    GABO BY LAPAROSCOPY N/A 12/26/2018    Procedure: GABO BY LAPAROSCOPY;  Surgeon: Gene Covarrubias M.D.;  Location: SURGERY [facility];  Service: General    OTHER SURGICAL PROCEDURE  08/2018    EGD       PHYSICAL EXAMINATION  Vital signs: There were no vitals taken for this visit.  Musculoskeletal: Gait is normal. No gross abnormalities  "noted.   Abnormal movements: none    MENTAL STATUS EXAMINATION    General: Cathy Spencer appears stated age and exhibits grooming which is appropriate, casual, and neat.  Hygiene is good.     Behavior: Pt is calm and cooperative with interview.  No apparent distress.  Eye contact is appropriate.   Psychomotor: Psychomotor agitation or retardation not noted.  Tics or tremors not noted.  Speech: rate within normal limits and volume within normal limits  Language: fluent english  Mood: \"alright\"  Affect: Flexible, Full range, Congruent with content, and Anxious,  Thought Process: Logical and Goal-directed  Thought Content: denies suicidal ideation, denies homicidal ideation. Within normal limits  Perception: denies auditory hallucinations, denies visual hallucinations. No delusions noted on interview.   Attention span and concentration: good  Orientation: Alert and Fully Oriented  Recent and remote memory: No gross evidence of memory deficits  Insight: Good  Judgment: Good    SAFETY ASSESSMENT - RISK TO SELF  Current suicide attempts or self harm: No  Past suicide attempts or self harm: No  History of suicide by family member: No  History of suicide by friend/significant other: No  Recent change in amount/specificity/intensity of suicidal thoughts or self-harm behavior: Yes but no longer endorsing SI.  Ongoing substance use disorder: No  Current access to firearms, medications, or other identified means of suicide/self-harm: No  If yes, willing to restrict access to means of suicide/self-harm: N/a  Protective factors present: Yes     SAFETY ASSESSMENT - RISK TO OTHERS  Current aggressive behavior or risk to others: No  Past aggressive behavior or risk to others: No  Recent change in amount/specificity/intensity of thoughts or threats to harm others? No  Current access to firearms/other identified means of harm? No  If yes, willing to restrict access to weapons/means of harm? N/a     CURRENT RISK ASSESSMENT    "    Suicide: Low       Homicide: Low       Self-Harm: Low       Relapse: Low    ASSESSMENT  Ctahy Spencer is a 26 y.o. old female presenting for follow up appointment for anxiety and depression. She does report continued anxiety since last visit but has noted an improvement with the increase of Buspar to 30 mg bid. She has been working on coping mechanisms. Pt does endorse vivid dreams most nights. Contrary to known side effects, she does report a previous improvement in this symptom on Trazodone in the past. However, it has caused grogginess in the mornings and is hesitant to restart the medication. Given that she is on high doses of both Zoloft and Buspar, there is consideration that increased serotonin levels are causing the vivid dreams. Did  patient's on avoiding other medication which acts on serotonergic receptors ie triptans for migraines. Plan to switch 9 pm dose of Buspar to the afternoon (2 pm) in an attempt to provide anxiety coverage but potentially have lower serotonin levels at bedtime.      Biological: mother with history of depression - predisposition to developing depression. Pt in tune with physical manifestations of panic attacks - able to recognize signs and let it pass.  Psychological: perfectionist; Pt struggles with not feeling in control  Social: lack of social support but has been improving - Pt has a dog, [employer] and has met people at the dog park. Good relationship with mother. Has been making more attempts at communicating with classmates.      Today, will plan to continue current medication of: Zoloft 200 mg, and Buspirone 30 mg bid - Pt takes Buspirone at 9 am and 2 pm.     DIAGNOSES/PLAN  Problem 1: Generalized Anxiety Disorder  Status: Active/Worsening  Medications: Zoloft 200 mg. Buspar 30 mg bid. Consider addition of propranolol in the future for PRN use.  Psychotherapy: undergoing weekly therapy     Medication options, alternatives (including no medications) and  medication risks/benefits/side effects were discussed in detail.  The patient was advised to call, message clinician on [employer], or come in to the clinic if symptoms worsen or if questions/issues regarding their medications arise.  The patient verbalized understanding and agreement.    The patient was educated to call 911, call the suicide hotline, or go to the local ER if having thoughts of suicide or homicide.  The patient verbalized understanding and agreement.   The proposed treatment plan was discussed with the patient who was provided the opportunity to ask questions and make suggestions regarding alternative treatment. Patient verbalized understanding and expressed agreement with the plan.      Return to clinic in 6 weeks or sooner if symptoms worsen.    This appointment was supervised by attending psychiatrist, Dr. Lucio Wylie MD, who agrees with assessment and treatment plan. See attending attestation for more details.     Jourdan Antonio D.O.  01/10/23

## 2023-01-26 RX ORDER — BUSPIRONE HYDROCHLORIDE 30 MG/1
30 TABLET ORAL 2 TIMES DAILY
Qty: 180 TABLET | Refills: 2 | Status: SHIPPED | OUTPATIENT
Start: 2023-01-30 | End: 2023-06-27 | Stop reason: SDUPTHER

## 2023-02-21 ENCOUNTER — OFFICE VISIT (OUTPATIENT)
Dept: BEHAVIORAL HEALTH | Facility: PSYCHIATRIC FACILITY | Age: 28
End: 2023-02-21

## 2023-02-21 VITALS
OXYGEN SATURATION: 97 % | HEART RATE: 71 BPM | DIASTOLIC BLOOD PRESSURE: 81 MMHG | SYSTOLIC BLOOD PRESSURE: 130 MMHG | BODY MASS INDEX: 30.1 KG/M2 | HEIGHT: 68 IN | WEIGHT: 198.6 LBS

## 2023-02-21 DIAGNOSIS — F43.21 ADJUSTMENT DISORDER WITH DEPRESSED MOOD: ICD-10-CM

## 2023-02-21 DIAGNOSIS — F41.1 GENERALIZED ANXIETY DISORDER: ICD-10-CM

## 2023-02-21 PROCEDURE — 99213 OFFICE O/P EST LOW 20 MIN: CPT | Mod: GE

## 2023-02-21 RX ORDER — HYDROXYZINE 50 MG/1
50 TABLET, FILM COATED ORAL 2 TIMES DAILY PRN
Qty: 60 TABLET | Refills: 3 | Status: SHIPPED | OUTPATIENT
Start: 2023-02-21 | End: 2023-08-14
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
## 2023-03-21 ENCOUNTER — OFFICE VISIT (OUTPATIENT)
Dept: BEHAVIORAL HEALTH | Facility: PSYCHIATRIC FACILITY | Age: 28
End: 2023-03-21

## 2023-03-21 VITALS
BODY MASS INDEX: 30.22 KG/M2 | HEART RATE: 70 BPM | HEIGHT: 68 IN | SYSTOLIC BLOOD PRESSURE: 123 MMHG | OXYGEN SATURATION: 91 % | WEIGHT: 199.4 LBS | DIASTOLIC BLOOD PRESSURE: 74 MMHG

## 2023-03-21 DIAGNOSIS — F41.1 GENERALIZED ANXIETY DISORDER: ICD-10-CM

## 2023-03-21 DIAGNOSIS — F33.1 MODERATE EPISODE OF RECURRENT MAJOR DEPRESSIVE DISORDER (HCC): ICD-10-CM

## 2023-03-21 PROCEDURE — 99213 OFFICE O/P EST LOW 20 MIN: CPT | Mod: GE

## 2023-03-21 RX ORDER — SERTRALINE HYDROCHLORIDE 100 MG/1
200 TABLET, FILM COATED ORAL
Qty: 60 TABLET | Refills: 3 | Status: SHIPPED | OUTPATIENT
Start: 2023-04-20 | End: 2023-06-13

## 2023-03-21 NOTE — PROGRESS NOTES
[facility]  Medication Management Note    Evaluation completed by: Jourdan Antonio D.O.   Date of Service: 3/21/23   Appointment type: in-office appointment.    Information below was collected from: patient    Special language or communication needs: No  Responded to any questions about patient rights: No  Reviewed limits of confidentiality: Yes  Confidentiality: The patient was informed that her medical records are confidential except for use by the treatment team in this clinic and others involved in her care.  Records may be shared with outside entities if the patient signs a release of information.  Information may be shared with appropriate authorities without a release of information to report instances of child/elder abuse or if it is determined she is in imminent risk of harm to self or others.     CHIEF COMPLAINT/REASON FOR VISIT  Follow up anxiety     HISTORY OF PRESENT ILLNESS  Cathy Spencer is a 26 y.o. old female who presents today for follow up management of generalized anxiety disorder.     Pt has been managing her anxiety well even with notable stressors with her car repair and thesis changes. She is undergoing weekly therapy and continuing to engage in coping strategies. She has not picked up the previously prescribed Hydroxyzine. With the Zoloft, she states that with treatment she has noticed an improvement in mood and that it helps with keeping her mind clear enough so that she can reason through things. Her mother is visiting for spring break. She denies SI.    CURRENT MEDICATIONS, ADHERENCE, AND SIDE EFFECTS   Zoloft 200 mg qdaily  Buspar 30 mg bid  Pt has not picked up prescription for Hydroxyzine 50 mg bid PRN    PSYCHIATRIC REVIEW OF SYSTEMS  Depression: improvement in mood as car issues are being dealt with.   Anxiety: anxiety present albeit manageable. It is not debilitating.   Panic: two panic attacks in the past month  PTSD: no flashbacks, nightmares,  hypervigilance or avoidant behavior regarding past trauma or abuse.   Xochitl: denies recent period of distractibility, impulsivity, grandiosity, flight of ideas, increased activity, not needing sleeping  Psychosis: no AVH, paranoia or delusions      MEDICAL REVIEW OF SYSTEMS  Review of Systems   Constitutional: Negative.    HENT: Negative.     Eyes: Negative.    Respiratory: Negative.     Cardiovascular: Negative.    Gastrointestinal: Negative.    Psychiatric/Behavioral:  The patient is nervous/anxious.       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[employer], currently working on thesis     MEDICAL HISTORY    Past Medical History   Past Medical History:  No date: Anxiety  No date: Bowel habit changes      Comment:  constipation  No date: Depression  No date: Heart burn  No date: Indigestion  12/21/2018: Pain      Comment:  back and abd., 1-2/10      Past Surgical History         Past Surgical History:   Procedure Laterality Date    GABO BY LAPAROSCOPY N/A 12/26/2018     Procedure: GABO BY LAPAROSCOPY;  Surgeon: Gene Covarrubias M.D.;  Location: SURGERY [facility];  Service: General    OTHER SURGICAL PROCEDURE   08/2018     EGD            PHYSICAL EXAMINATION  Vital signs: There were no vitals taken for this visit.  Musculoskeletal: Gait is normal. No gross abnormalities noted.   Abnormal movements: none     MENTAL STATUS EXAMINATION    General: Cathy Spencer appears stated age and exhibits grooming which is appropriate, casual, and neat.  Hygiene is good.     Behavior: Pt is calm and cooperative with interview.  No apparent distress.  Eye contact is appropriate.   Psychomotor: Psychomotor agitation or retardation not noted.  Tics or tremors not noted.  Speech: rate within normal limits and volume within normal limits  Language:  "fluent english  Mood: \"good\"  Affect: congruent with mood, moderate range  Thought Process: Logical and Goal-directed  Thought Content: denies suicidal ideation, denies homicidal ideation.   Perception: denies auditory hallucinations, denies visual hallucinations. No delusions noted on interview.   Attention span and concentration: good  Orientation: Alert and Fully Oriented  Recent and remote memory: No gross evidence of memory deficits  Insight: Good  Judgment: Good     SAFETY ASSESSMENT - RISK TO SELF  Current suicide attempts or self harm: No  Past suicide attempts or self harm: No  History of suicide by family member: No  History of suicide by friend/significant other: No  Recent change in amount/specificity/intensity of suicidal thoughts or self-harm behavior: yes, but situated on the car. No active SI.   Ongoing substance use disorder: No  Current access to firearms, medications, or other identified means of suicide/self-harm: No  If yes, willing to restrict access to means of suicide/self-harm: N/a  Protective factors present: Yes     SAFETY ASSESSMENT - RISK TO OTHERS  Current aggressive behavior or risk to others: No  Past aggressive behavior or risk to others: No  Recent change in amount/specificity/intensity of thoughts or threats to harm others? No  Current access to firearms/other identified means of harm? No  If yes, willing to restrict access to weapons/means of harm? N/a     CURRENT RISK ASSESSMENT       Suicide: Low       Homicide: Low       Self-Harm: Low       Relapse: Low     ASSESSMENT  Cathy Spencer is a 26 y.o. old female presenting for follow up appointment for anxiety and depression.      Anxiety and mood have both improved. She has noticed a slight improvement in symptoms with the reduction of caffeine intake. She did have notable stressors concerning her thesis but was able to manage it without significant issues. Continuing to encourage weekly therapy and stressing importance " of behavioral activation to increase motivation. She was instructed to  the previously prescribed Hydroxyzine if anxiety worsens. Pt denies SI; no risk of harm to self.      DIAGNOSES/PLAN  Problem 1: Generalized Anxiety Disorder  Status: Active/Worsening  Medications: Zoloft 200 mg qdaily. Buspar 30 mg bid. Can  previously prescribed Hydroxyzine if anxiety worsens. Consider addition of propranolol in the future for PRN use.  Psychotherapy: undergoing weekly therapy      Problem 2: Adjustment Disorder  Psychotherapy: undergoing weekly therapy      Medication options, alternatives (including no medications) and medication risks/benefits/side effects were discussed in detail.     The patient was advised to call, message clinician on [employer], or come in to the clinic if symptoms worsen or if questions/issues regarding their medications arise.  The patient verbalized understanding and agreement.       The patient was educated to call 911, call the suicide hotline, or go to the local ER if having thoughts of suicide or homicide.  The patient verbalized understanding and agreement.      The proposed treatment plan was discussed with the patient who was provided the opportunity to ask questions and make suggestions regarding alternative treatment. Patient verbalized understanding and expressed agreement with the plan.       Return to clinic in 4 weeks or sooner if symptoms worsen.     This appointment was supervised by attending psychiatrist, Dr. Wylie, who agrees with assessment and treatment plan. See attending attestation for more details.      Jourdan Antonio D.O.  3/21/23

## 2023-05-02 ENCOUNTER — OFFICE VISIT (OUTPATIENT)
Dept: BEHAVIORAL HEALTH | Facility: PSYCHIATRIC FACILITY | Age: 28
End: 2023-05-02

## 2023-05-02 DIAGNOSIS — F41.1 GENERALIZED ANXIETY DISORDER: ICD-10-CM

## 2023-05-02 PROBLEM — F43.21 ADJUSTMENT DISORDER WITH DEPRESSED MOOD: Status: RESOLVED | Noted: 2023-02-21 | Resolved: 2023-05-02

## 2023-05-02 PROCEDURE — 99213 OFFICE O/P EST LOW 20 MIN: CPT | Mod: GE

## 2023-05-02 NOTE — PROGRESS NOTES
[facility]  Medication Management Note    Evaluation completed by: Jourdan Antonio D.O.   Date of Service: 5/2/23   Appointment type: in-office appointment.    Information below was collected from: patient    Special language or communication needs: No  Responded to any questions about patient rights: No  Reviewed limits of confidentiality: Yes  Confidentiality: The patient was informed that her medical records are confidential except for use by the treatment team in this clinic and others involved in her care.  Records may be shared with outside entities if the patient signs a release of information.  Information may be shared with appropriate authorities without a release of information to report instances of child/elder abuse or if it is determined she is in imminent risk of harm to self or others.     CHIEF COMPLAINT/REASON FOR VISIT  Follow up anxiety     HISTORY OF PRESENT ILLNESS  Cathy Spencer is a 26 y.o. old female who presents today for follow up management of generalized anxiety disorder.     She is undergoing weekly therapy and continuing to engage in coping strategies. Pt reports that with therapy she has begun to feel less panicked and has developed greater confidence in dealing with her anxiety. 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 She explains that there is less direction over the summer as the professors are typically on vacation. She has needed to utilize hydroxyzine frequently with good efficacy. Discussed utilizing a planner to provide greater structure in the week and to increase motivation. Pt states that she has intrusive thoughts related to significant and high stress events but that these are not a daily occurrence. She is happy with continuing the current medication  regimen but wonders if she has grown to develop a dependence on the Zoloft.     CURRENT MEDICATIONS, ADHERENCE, AND SIDE EFFECTS   Zoloft 200 mg qdaily  Buspar 30 mg bid  Hydroxyzine 50 mg bid PRN    PSYCHIATRIC REVIEW OF SYSTEMS  Anxiety: anxiety has worsened with the approaching end of the school year. Causing sleep impairment which she utilizes trazodone for.   Panic: two panic attacks weekly     MEDICAL REVIEW OF SYSTEMS  Review of Systems   Constitutional: Negative.    HENT: Negative.     Eyes: Negative.    Respiratory: Negative.     Cardiovascular: Negative.    Gastrointestinal: Negative.    Psychiatric/Behavioral:  The patient is nervous/anxious.       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[employer], currently working on thesis     MEDICAL HISTORY    Past Medical History   Past Medical History:  No date: Anxiety  No date: Bowel habit changes      Comment:  constipation  No date: Depression  No date: Heart burn  No date: Indigestion  12/21/2018: Pain      Comment:  back and abd., 1-2/10      Past Surgical History         Past Surgical History:   Procedure Laterality Date    GABO BY LAPAROSCOPY N/A 12/26/2018     Procedure: GABO BY LAPAROSCOPY;  Surgeon: Gene Covarrubias M.D.;  Location: SURGERY [facility];  Service: General    OTHER SURGICAL PROCEDURE   08/2018     EGD            PHYSICAL EXAMINATION  Vital signs: There were no vitals taken for this visit.  Musculoskeletal: Gait is normal. No gross abnormalities noted.   Abnormal movements: none     MENTAL STATUS EXAMINATION    General: Cathy Spencer appears stated age and exhibits grooming which is appropriate, casual, and neat.  Hygiene is good.     Behavior: Pt is calm and cooperative with interview.  No apparent distress.  Eye contact is appropriate.   Psychomotor:  "Psychomotor agitation or retardation not noted.  Tics or tremors not noted.  Speech: rate within normal limits and volume within normal limits  Language: fluent english  Mood: \"good\"  Affect: congruent with mood, moderate range  Thought Process: Logical and Goal-directed  Thought Content: denies suicidal ideation, denies homicidal ideation.   Perception: denies auditory hallucinations, denies visual hallucinations. No delusions noted on interview.   Attention span and concentration: good  Orientation: Alert and Fully Oriented  Recent and remote memory: No gross evidence of memory deficits  Insight: Good  Judgment: Good     SAFETY ASSESSMENT - RISK TO SELF  Current suicide attempts or self harm: No  Past suicide attempts or self harm: No  History of suicide by family member: No  History of suicide by friend/significant other: No  Recent change in amount/specificity/intensity of suicidal thoughts or self-harm behavior: yes, but situated on the car. No active SI.   Ongoing substance use disorder: No  Current access to firearms, medications, or other identified means of suicide/self-harm: No  If yes, willing to restrict access to means of suicide/self-harm: N/a  Protective factors present: Yes     SAFETY ASSESSMENT - RISK TO OTHERS  Current aggressive behavior or risk to others: No  Past aggressive behavior or risk to others: No  Recent change in amount/specificity/intensity of thoughts or threats to harm others? No  Current access to firearms/other identified means of harm? No  If yes, willing to restrict access to weapons/means of harm? N/a     CURRENT RISK ASSESSMENT       Suicide: Low       Homicide: Low       Self-Harm: Low       Relapse: Low     ASSESSMENT  Cathy Spencer is a 26 y.o. old female presenting for follow up appointment for anxiety. With continued weekly therapy, she has noticed a difference in her ability to cope and manage the anxiety but she does admit to recent worsening as the school year " comes to an end. Continuing to encourage weekly therapy and stressing importance of behavioral activation to increase motivation, specifically utilizing a planner over the summer to stay on task. PRN hydroxyzine has been effective in treating breakthrough anxiety. Consider crosstapering to Prozac if symptoms worsen as she has been on Zoloft for 2-3 years.     DIAGNOSES/PLAN  Problem 1: Generalized Anxiety Disorder  Status: Active/Worsening  Medications: Zoloft 200 mg qdaily. Buspar 30 mg bid. Continue utilizing Hydroxyzine for breakthrough anxiety. Consider addition of propranolol in the future for PRN use.  Psychotherapy: undergoing weekly therapy     Medication options, alternatives (including no medications) and medication risks/benefits/side effects were discussed in detail.    The patient was advised to call, message clinician on [employer], or come in to the clinic if symptoms worsen or if questions/issues regarding their medications arise.  The patient verbalized understanding and agreement.       The patient was educated to call 911, call the suicide hotline, or go to the local ER if having thoughts of suicide or homicide.  The patient verbalized understanding and agreement.      The proposed treatment plan was discussed with the patient who was provided the opportunity to ask questions and make suggestions regarding alternative treatment. Patient verbalized understanding and expressed agreement with the plan.       Return to clinic in 8 weeks or sooner if symptoms worsen.     This appointment was supervised by attending psychiatrist, Dr. Wylie, who agrees with assessment and treatment plan. See attending attestation for more details.      Jourdan Antonio D.O.  5/2/23

## 2023-05-08 NOTE — PROGRESS NOTES
[facility]  Medication Management Note    Evaluation completed by: Yoon Cruz M.D.   Date of Service: 01/11/22   Appointment type: in-office appointment.    Information below was collected from: patient    Special language or communication needs: No  Responded to any questions about patient rights: No  Reviewed limits of confidentiality: Yes  Confidentiality: The patient was informed that her medical records are confidential except for use by the treatment team in this clinic and others involved in her care.  Records may be shared with outside entities if the patient signs a release of information.  Information may be shared with appropriate authorities without a release of information to report instances of child/elder abuse or if it is determined she is in imminent risk of harm to self or others.     CHIEF COMPLAINT/REASON FOR VISIT  Med mgmt    HISTORY OF PRESENT ILLNESS  Cathy Spencer is a 26 y.o. old female who presents today for follow up management of MDD, JONNY.   Pt was last seen on 11/23/2021, at which time the plan was to continue buspar, continue sertraline, gradually taper off klonipin, work on decrease marijuana vaping. Last fall, discontinued abilify due to weight gain and ineffectiveness; at last visit weight was 230lbs up from prior weight 218lbs. Today weight 226lbs. She has been able to decrease marijuana vaping.     Her mom thinks the patient has ADHD because her father has ADHD.     She reports good mood after traveling to [place] to visit family during the holidays. She is taking museum training for anthropologists and is looking forward to this. She is expected to graduate spring of 2023.     She reports good sleep. Her appetite is normal/lower than it had been. She hasn't had any binge eating episodes. Her symptoms are correlated to stress and she is working on stress mgmt with her therapist. She reports her daily mood averages 7/10 with 10 being the  best.    Safety assessment was completed and patient is denying active suicidal ideations, plan or intent. Patient verbalized the importance of reaching out to close family/friends or calling 911 or going to nearest emergency room if any worsening of suicidal ideations or new safety concerns.      PSYCHOSOCIAL CHANGES SINCE LAST VISIT   Recent travel to [place]    CURRENT MEDICATIONS, ADHERENCE, AND SIDE EFFECTS   Current Outpatient Medications   Medication Instructions   • busPIRone (BUSPAR) 20 mg, Oral, 2 TIMES DAILY   • clonazePAM (KLONOPIN) 0.5 mg, Oral, 1 TIME DAILY PRN   • loratadine (CLARITIN) 10 mg, Oral, DAILY   • pseudoephedrine (SUDAFED) 60 mg, Oral, 2 TIMES DAILY PRN   • sertraline (ZOLOFT) 50 MG Tab No dose, route, or frequency recorded.   • sertraline (ZOLOFT) 150 mg, Oral, EVERY DAY   • traZODone (DESYREL) 50 MG Tab TAKE 1 TABLET BY MOUTH EVERY DAY AT BEDTIME AS NEEDED FOR SLEEP          PSYCHIATRIC REVIEW OF SYSTEMS  Depression: mild anhedonia, fatigue, insomnia, distractibility  Anxiety: mild   Panic: denies   OCD: denies  PTSD: denies  Xochitl: denies  Psychosis: denies  ADHD: denies  Eating Disorders: denies recent binge eating  Autism Spectrum Disorder: denies  Sleep: denies  Behavioral: denies    MEDICAL REVIEW OF SYSTEMS  Review of Systems   Constitutional: Negative for chills and fever.   HENT: Positive for congestion and sore throat.    Eyes: Negative.    Respiratory: Positive for sputum production.    Cardiovascular: Negative for chest pain.   Gastrointestinal: Negative for abdominal pain, nausea and vomiting.   Genitourinary: Negative.    Musculoskeletal: Negative.    Skin: Negative for itching and rash.   Neurological: Negative for dizziness, seizures, loss of consciousness and headaches.   Endo/Heme/Allergies: Negative.    Psychiatric/Behavioral: Positive for depression. Negative for suicidal ideas. The patient is not nervous/anxious and does not have insomnia.        ALLERGIES  No Known  "Allergies     PAST PSYCHIATRIC HISTORY  No hx of psychiatric hospitalization  No hx of suicide attempts or self harm     SOCIAL HISTORY SUMMARY  Lives alone  In masters program at [employer]; mother financially supports her and is good source of social support  Father  earlier this year      MEDICAL HISTORY  Past Medical History:  No date: Anxiety  No date: Bowel habit changes      Comment:  constipation  No date: Depression  No date: Heart burn  No date: Indigestion  2018: Pain      Comment:  back and abd., 1-2/10   Past Surgical History:   Procedure Laterality Date   • GABO BY LAPAROSCOPY N/A 2018    Procedure: GABO BY LAPAROSCOPY;  Surgeon: Gene Covarrubias M.D.;  Location: SURGERY [facility];  Service: General   • OTHER SURGICAL PROCEDURE  2018    EGD        FAMILY PSYCHIATRIC HISTORY  Half brother bipolar    FAMILY MEDICAL HISTORY  Family History   Problem Relation Age of Onset   • Arthritis Mother    • Psychiatric Illness Mother    • Diabetes Father    • Stroke Father    • Heart Disease Father    • Psychiatric Illness Other    • Bipolar disorder Other        PHYSICAL EXAMINATION  Vital signs: There were no vitals taken for this visit.  Musculoskeletal: Gait is normal. No gross abnormalities noted.   Abnormal movements: none    MENTAL STATUS EXAMINATION    General: Cathy Spencer appears stated age and exhibits grooming which is appropriate.  Hygiene is good.     Behavior: Pt is calm and cooperative with interview.  No apparent distress.  Eye contact is appropriate.   Psychomotor: Psychomotor agitation or retardation not noted.  Tics or tremors not noted.  Speech: rate within normal limits and volume within normal limits  Language: fluent English  Mood: \"good\"  Affect: Flexible, Full range, Congruent with content and Happy,  Thought Process: Logical and Goal-directed  Thought Content: denies suicidal ideation, denies homicidal ideation. Within normal limits  Perception: denies " auditory hallucinations, denies visual hallucinations. No delusions noted on interview.  Also noted on exam: n/a  Attention span and concentration: intact  Orientation: Alert and Fully Oriented  Recent and remote memory: No gross evidence of memory deficits  Insight: Good  Judgment: Good    SAFETY ASSESSMENT - RISK TO SELF  • Current suicide attempts or self harm: No  • Past suicide attempts or self harm: No  • History of suicide by family member: No  • History of suicide by friend/significant other: No  • Recent change in amount/specificity/intensity of suicidal thoughts or self-harm behavior: No  • Ongoing substance use disorder: Yes, marijuana  • Current access to firearms, medications, or other identified means of suicide/self-harm: No  • If yes, willing to restrict access to means of suicide/self-harm: N/a  • Protective factors present: Yes     SAFETY ASSESSMENT - RISK TO OTHERS  • Current aggressive behavior or risk to others: No  • Past aggressive behavior or risk to others: No  • Recent change in amount/specificity/intensity of thoughts or threats to harm others? No  • Current access to firearms/other identified means of harm? No  • If yes, willing to restrict access to weapons/means of harm? N/a     CURRENT RISK ASSESSMENT       Suicide: Low       Homicide: Low       Self-Harm: Low       Relapse: Not applicable       Crisis Safety Plan Reviewed Yes    [place]  records   reviewed.  No concerns about misuse of controlled substance.    ASSESSMENT  Cathy Spencer is a 25 y.o. old female presenting for follow up management of MDD with anxious distress, JONNY. Recently discontinued antipsychotic last fall, and started sertraline, now on 150mg daily and reports good effect on mood and anxiety. Also on buspar 20mg BID to good effect. She is agreeable to plan to gradually discontinue klonipin; currently only taking PRN a few times weekly. She will continue with her therapist weekly. She denies suicidal  thoughts, intent, or plan today. Controlled substance agreement on file.     Biological: recent weight fluctuations and binge eating 2/2 stress  Psychological: protective factors: close relationship with mother, recent visit to family, future oriented in master's program  Social: chronic feeling lonely, difficulty making social connections    Today, will plan to continue current regimen of sertraline 150mg daily, buspar 20mg BID, and klonipin 0.5mg daily prn for no more than 2x weekly dose with plan to discontinue. She has klonipin leftover so no new Rx needed.    DIAGNOSES/PLAN  1. Generalized anxiety disorder     2. Moderate episode of recurrent major depressive disorder (HCC)     3. Marijuana use, episodic         Most recent labs done on Aug 2021 and showed unremarkable cbc, cmp.    • Medication options, alternatives (including no medications) and medication risks/benefits/side effects were discussed in detail.  • The patient was advised to call, message clinician on [employer], or come in to the clinic if symptoms worsen or if questions/issues regarding their medications arise.  The patient verbalized understanding and agreement.    • The patient was educated to call 911, call the suicide hotline, or go to the local ER if having thoughts of suicide or homicide.  The patient verbalized understanding and agreement.   • The proposed treatment plan was discussed with the patient who was provided the opportunity to ask questions and make suggestions regarding alternative treatment. Patient verbalized understanding and expressed agreement with the plan.      Return to clinic in 6 weeks or sooner if symptoms worsen.    This appointment was supervised by attending psychiatrist, Lucio Wylie MD, who agrees with assessment and treatment plan.  See attending attestation for more details.       Yoon Cruz M.D.  01/11/22               no

## 2023-06-10 DIAGNOSIS — F41.1 GENERALIZED ANXIETY DISORDER: ICD-10-CM

## 2023-06-10 DIAGNOSIS — F33.1 MODERATE EPISODE OF RECURRENT MAJOR DEPRESSIVE DISORDER (HCC): ICD-10-CM

## 2023-06-13 RX ORDER — SERTRALINE HYDROCHLORIDE 100 MG/1
200 TABLET, FILM COATED ORAL
Qty: 180 TABLET | Refills: 1 | Status: SHIPPED | OUTPATIENT
Start: 2023-06-13 | End: 2023-10-11

## 2023-06-27 ENCOUNTER — OFFICE VISIT (OUTPATIENT)
Dept: BEHAVIORAL HEALTH | Facility: PSYCHIATRIC FACILITY | Age: 28
End: 2023-06-27

## 2023-06-27 VITALS
WEIGHT: 198 LBS | SYSTOLIC BLOOD PRESSURE: 130 MMHG | OXYGEN SATURATION: 97 % | HEIGHT: 68 IN | HEART RATE: 69 BPM | BODY MASS INDEX: 30.01 KG/M2 | DIASTOLIC BLOOD PRESSURE: 94 MMHG

## 2023-06-27 DIAGNOSIS — F41.1 GENERALIZED ANXIETY DISORDER: ICD-10-CM

## 2023-06-27 PROCEDURE — 3080F DIAST BP >= 90 MM HG: CPT

## 2023-06-27 PROCEDURE — 99213 OFFICE O/P EST LOW 20 MIN: CPT | Mod: GE

## 2023-06-27 PROCEDURE — 3075F SYST BP GE 130 - 139MM HG: CPT

## 2023-06-27 RX ORDER — BUSPIRONE HYDROCHLORIDE 30 MG/1
30 TABLET ORAL 2 TIMES DAILY
Qty: 180 TABLET | Refills: 2 | Status: SHIPPED | OUTPATIENT
Start: 2023-06-27 | End: 2024-03-23

## 2023-06-27 NOTE — PROGRESS NOTES
[facility]  Medication Management Note    Evaluation completed by: Jourdan Antonio D.O.   Date of Service: 6/27/23   Appointment type: in-office appointment.    Information below was collected from: patient    Special language or communication needs: No  Responded to any questions about patient rights: No  Reviewed limits of confidentiality: Yes  Confidentiality: The patient was informed that her medical records are confidential except for use by the treatment team in this clinic and others involved in her care.  Records may be shared with outside entities if the patient signs a release of information.  Information may be shared with appropriate authorities without a release of information to report instances of child/elder abuse or if it is determined she is in imminent risk of harm to self or others.     CHIEF COMPLAINT/REASON FOR VISIT  Follow up anxiety     HISTORY OF PRESENT ILLNESS  Cathy Spencer is a 26 y.o. old female who presents today for follow up management of generalized anxiety disorder.     She is undergoing weekly therapy and continuing to engage in coping strategies.  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  Patient agrees that approaching tasks in a stepwise goal oriented manner may be helpful in keeping her on task and holding her accountable.  Patient reports recent difficulties with staying asleep.  Often times she will wake up in the early morning and have trouble falling back asleep.  She attempted to take 50 mg of the hydroxyzine to help with the symptoms but slept for 12 hours and woke up groggy.  Patient also states that her appetite has been decreased as of late which has in turn affected her physical strength and mental state.  Patient reports that she does not like eating hot meals during the summer and is attempting to look for alternatives.    CURRENT MEDICATIONS, ADHERENCE, AND  SIDE EFFECTS   Zoloft 200 mg qdaily  Buspar 30 mg bid  Hydroxyzine 50 mg bid PRN    PSYCHIATRIC REVIEW OF SYSTEMS  Anxiety: anxiety has worsened with the approaching end of the school year. Causing sleep impairment which she utilizes trazodone for.   Panic: two panic attacks weekly     MEDICAL REVIEW OF SYSTEMS  Review of Systems   Constitutional: Negative.    HENT: Negative.     Eyes: Negative.    Respiratory: Negative.     Cardiovascular: Negative.    Gastrointestinal: Negative.    Psychiatric/Behavioral:  The patient is nervous/anxious.       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[employer], currently working on thesis     MEDICAL HISTORY    Past Medical History   Past Medical History:  No date: Anxiety  No date: Bowel habit changes      Comment:  constipation  No date: Depression  No date: Heart burn  No date: Indigestion  12/21/2018: Pain      Comment:  back and abd., 1-2/10      Past Surgical History         Past Surgical History:   Procedure Laterality Date    GABO BY LAPAROSCOPY N/A 12/26/2018     Procedure: GABO BY LAPAROSCOPY;  Surgeon: Gene Covarrubias M.D.;  Location: SURGERY [facility];  Service: General    OTHER SURGICAL PROCEDURE   08/2018     EGD            PHYSICAL EXAMINATION  Vital signs: There were no vitals taken for this visit.  Musculoskeletal: Gait is normal. No gross abnormalities noted.   Abnormal movements: none     MENTAL STATUS EXAMINATION    General: Cathy Spencer appears stated age and exhibits grooming which is appropriate, casual, and neat.  Hygiene is good.     Behavior: Pt is calm and cooperative with interview.  No apparent distress.  Eye contact is appropriate.   Psychomotor: Psychomotor agitation or retardation not noted.  Tics or tremors not noted.  Speech: rate within normal limits and  "volume within normal limits  Language: fluent english  Mood: \"Okay\"  Affect: Appears anxious, moderate range   Thought Process: Logical and Goal-directed  Thought Content: denies suicidal ideation, denies homicidal ideation.   Perception: denies auditory hallucinations, denies visual hallucinations. No delusions noted on interview.   Attention span and concentration: good  Orientation: Alert and Fully Oriented  Recent and remote memory: No gross evidence of memory deficits  Insight: Good  Judgment: Good     SAFETY ASSESSMENT - RISK TO SELF  Current suicide attempts or self harm: No  Past suicide attempts or self harm: No  History of suicide by family member: No  History of suicide by friend/significant other: No  Recent change in amount/specificity/intensity of suicidal thoughts or self-harm behavior: yes, but situated on the car. No active SI.   Ongoing substance use disorder: No  Current access to firearms, medications, or other identified means of suicide/self-harm: No  If yes, willing to restrict access to means of suicide/self-harm: N/a  Protective factors present: Yes     SAFETY ASSESSMENT - RISK TO OTHERS  Current aggressive behavior or risk to others: No  Past aggressive behavior or risk to others: No  Recent change in amount/specificity/intensity of thoughts or threats to harm others? No  Current access to firearms/other identified means of harm? No  If yes, willing to restrict access to weapons/means of harm? N/a     CURRENT RISK ASSESSMENT       Suicide: Low       Homicide: Low       Self-Harm: Low       Relapse: Low     ASSESSMENT  Cathy Spencer is a 26 y.o. old female presenting for follow up appointment for anxiety. With continued weekly therapy, she has noticed a difference in her ability to cope and manage the anxiety.  She does report fear of voice during her summer and not being productive on her thesis.  Did discuss creating weekly schedule and approaching tasks in a goal oriented " manner.  Continuing to encourage weekly therapy and stressing importance of behavioral activation.  PRN hydroxyzine has been effective in treating breakthrough anxiety.  Discussed utilization of 25 mg of hydroxyzine around bedtime to decrease anxiety and improve sleep.  Discussed sleep hygiene.  Consider nutritionist referral if patient continues to struggle with appetite and diet.  Consider crosstapering to Prozac if symptoms worsen as she has been on Zoloft for 2-3 years.     DIAGNOSES/PLAN  Problem 1: Generalized Anxiety Disorder  Status: Active/Worsening  Medications: Zoloft 200 mg qdaily. Buspar 30 mg bid. Continue utilizing Hydroxyzine for breakthrough anxiety. Consider addition of propranolol in the future for PRN use.  Psychotherapy: undergoing weekly therapy     Medication options, alternatives (including no medications) and medication risks/benefits/side effects were discussed in detail.    The patient was advised to call, message clinician on [employer], or come in to the clinic if symptoms worsen or if questions/issues regarding their medications arise.  The patient verbalized understanding and agreement.       The patient was educated to call 911, call the suicide hotline, or go to the local ER if having thoughts of suicide or homicide.  The patient verbalized understanding and agreement.      The proposed treatment plan was discussed with the patient who was provided the opportunity to ask questions and make suggestions regarding alternative treatment. Patient verbalized understanding and expressed agreement with the plan.       Return to clinic in 8 weeks or sooner if symptoms worsen.     This appointment was supervised by attending psychiatrist, Dr. Weaver, who agrees with assessment and treatment plan. See attending attestation for more details.      Jourdan Antonio D.O.  6/27/23

## 2023-08-14 ENCOUNTER — OFFICE VISIT (OUTPATIENT)
Dept: BEHAVIORAL HEALTH | Facility: PSYCHIATRIC FACILITY | Age: 28
End: 2023-08-14

## 2023-08-14 VITALS
OXYGEN SATURATION: 96 % | HEIGHT: 68 IN | BODY MASS INDEX: 30.31 KG/M2 | DIASTOLIC BLOOD PRESSURE: 81 MMHG | WEIGHT: 200 LBS | HEART RATE: 71 BPM | SYSTOLIC BLOOD PRESSURE: 107 MMHG

## 2023-08-14 DIAGNOSIS — F41.1 GENERALIZED ANXIETY DISORDER: ICD-10-CM

## 2023-08-14 PROCEDURE — 99214 OFFICE O/P EST MOD 30 MIN: CPT | Mod: GC

## 2023-08-14 PROCEDURE — 3074F SYST BP LT 130 MM HG: CPT

## 2023-08-14 PROCEDURE — 3079F DIAST BP 80-89 MM HG: CPT

## 2023-08-14 RX ORDER — PROPRANOLOL HYDROCHLORIDE 10 MG/1
10 TABLET ORAL 2 TIMES DAILY PRN
Qty: 60 TABLET | Refills: 2 | Status: SHIPPED | OUTPATIENT
Start: 2023-08-14 | End: 2023-09-07

## 2023-08-14 NOTE — PROGRESS NOTES
[facility]  Medication Management Note    Evaluation completed by: Jourdan Antonio D.O.   Date of Service: 8/14/23   Appointment type: in-office appointment.    Information below was collected from: patient    Special language or communication needs: No  Responded to any questions about patient rights: No  Reviewed limits of confidentiality: Yes  Confidentiality: The patient was informed that her medical records are confidential except for use by the treatment team in this clinic and others involved in her care.  Records may be shared with outside entities if the patient signs a release of information.  Information may be shared with appropriate authorities without a release of information to report instances of child/elder abuse or if it is determined she is in imminent risk of harm to self or others.     CHIEF COMPLAINT/REASON FOR VISIT  Follow up anxiety     HISTORY OF PRESENT ILLNESS  Cathy Spencer is a 26 y.o. old female who presents today for follow up management of generalized anxiety disorder.     She is undergoing weekly therapy and continuing to engage in coping strategies. She reports that her anxiety has worsened as a result of the approaching school semester. It is largely manageable but does endorse breakthrough panic attacks on average of twice a week. These are most centered around her thesis and going out in public settings. These last for an average of 30 minutes but dissipate when she removes herself from the situation. She experiences significant physical symptoms ie increased heart rate, hyperventilation, and muscle tension which further exacerbates her anxiety. She does report an instance where she had an unpleasant interaction with another . This reinforced negative thoughts which she had towards herself. She did experience intermittent thoughts of suicide at the time but these were fleeting. She denies having any such thoughts today.      CURRENT  MEDICATIONS, ADHERENCE, AND SIDE EFFECTS   Zoloft 200 mg qdaily  Buspar 30 mg bid  Hydroxyzine 50 mg bid PRN    PSYCHIATRIC REVIEW OF SYSTEMS  Anxiety: worsening anxiety with the approaching new school semester. She has lots of meetings and presentations which stress her out. She struggles with meeting new individuals and being in public settings.   Panic: two panic attacks weekly     MEDICAL REVIEW OF SYSTEMS  Review of Systems   Constitutional: Negative.    HENT: Negative.     Eyes: Negative.    Respiratory: Negative.     Cardiovascular: Negative.    Gastrointestinal: Negative.    Psychiatric/Behavioral:  The patient is nervous/anxious.       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[employer], currently working on thesis     MEDICAL HISTORY    Past Medical History   Past Medical History:  No date: Anxiety  No date: Bowel habit changes      Comment:  constipation  No date: Depression  No date: Heart burn  No date: Indigestion  12/21/2018: Pain      Comment:  back and abd., 1-2/10      Past Surgical History         Past Surgical History:   Procedure Laterality Date    GABO BY LAPAROSCOPY N/A 12/26/2018     Procedure: GABO BY LAPAROSCOPY;  Surgeon: Gene Covarrubias M.D.;  Location: SURGERY [facility];  Service: General    OTHER SURGICAL PROCEDURE   08/2018     EGD            PHYSICAL EXAMINATION  Vital signs: There were no vitals taken for this visit.  Musculoskeletal: Gait is normal. No gross abnormalities noted.   Abnormal movements: none     MENTAL STATUS EXAMINATION    General: Cathy Spencer appears stated age and exhibits grooming which is appropriate, casual, and neat.  Hygiene is good.     Behavior: Pt is calm and cooperative with interview.  No apparent distress.  Eye contact is appropriate.   Psychomotor: Psychomotor  "agitation or retardation not noted.  Tics or tremors not noted.  Speech: rate within normal limits and volume within normal limits  Language: fluent english  Mood: \"alright\"  Affect: Appears anxious, moderate range   Thought Process: Logical and Goal-directed  Thought Content: denies suicidal ideation, denies homicidal ideation.   Perception: denies auditory hallucinations, denies visual hallucinations. No delusions noted on interview.   Attention span and concentration: good  Orientation: Alert and Fully Oriented  Recent and remote memory: No gross evidence of memory deficits  Insight: Good  Judgment: Good     SAFETY ASSESSMENT - RISK TO SELF  Current suicide attempts or self harm: No  Past suicide attempts or self harm: No  History of suicide by family member: No  History of suicide by friend/significant other: No  Recent change in amount/specificity/intensity of suicidal thoughts or self-harm behavior: yes, but situated on recent event with other . No current SI.   Ongoing substance use disorder: No  Current access to firearms, medications, or other identified means of suicide/self-harm: No  If yes, willing to restrict access to means of suicide/self-harm: N/a  Protective factors present: Yes     SAFETY ASSESSMENT - RISK TO OTHERS  Current aggressive behavior or risk to others: No  Past aggressive behavior or risk to others: No  Recent change in amount/specificity/intensity of thoughts or threats to harm others? No  Current access to firearms/other identified means of harm? No  If yes, willing to restrict access to weapons/means of harm? N/a     CURRENT RISK ASSESSMENT       Suicide: Low       Homicide: Low       Self-Harm: Low       Relapse: Low     ASSESSMENT  Cathy Spencer is a 26 y.o. old female presenting for follow up appointment for anxiety. With continued weekly therapy, she has noticed a difference in her ability to cope and manage the anxiety. As there is a strong social component to her " anxiety, and profound physical symptoms which tends to exacerbate her maladaptive thought processes, will plan to discontinue hydroxyzine and trial propranolol 10 mg bid PRN.     Discussed sleep hygiene.  Consider nutritionist referral if patient continues to struggle with appetite and diet.  Consider crosstapering to Prozac if symptoms worsen as she has been on Zoloft for 2-3 years. Providing Y-BOCS results, Pt may need a higher dose of Zoloft to target OCD.      DIAGNOSES/PLAN  Problem 1: Generalized Anxiety Disorder   - R/o OCD  Status: Active/Worsening  Medications: Zoloft 200 mg qdaily. Buspar 30 mg bid. Discontinue Hydroxyzine.   START Propranolol 10 mg bid for anxiety, social anxiety   Provided Y-BOCS to rule out OCD  Psychotherapy: undergoing weekly therapy     Medication options, alternatives (including no medications) and medication risks/benefits/side effects were discussed in detail.    The patient was advised to call, message clinician on [employer], or come in to the clinic if symptoms worsen or if questions/issues regarding their medications arise.  The patient verbalized understanding and agreement.       The patient was educated to call 911, call the suicide hotline, or go to the local ER if having thoughts of suicide or homicide.  The patient verbalized understanding and agreement.      The proposed treatment plan was discussed with the patient who was provided the opportunity to ask questions and make suggestions regarding alternative treatment. Patient verbalized understanding and expressed agreement with the plan.      Return to clinic in 6 weeks or sooner if symptoms worsen.     This appointment was supervised by attending psychiatrist, Dr. Wylie, who agrees with assessment and treatment plan. See attending attestation for more details.      Jourdan Antonio D.O.  8/14/23

## 2023-09-05 DIAGNOSIS — F41.1 GENERALIZED ANXIETY DISORDER: ICD-10-CM

## 2023-09-05 NOTE — TELEPHONE ENCOUNTER
Received request via: Pharmacy    Was the patient seen in the last year in this department? Yes, lov = 8/14/23    Does the patient have an active prescription (recently filled or refills available) for medication(s) requested?  End date: 9/13/23, 2 Refills    Does the patient have halfway Plus and need 100 day supply (blood pressure, diabetes and cholesterol meds only)? Patient does not have SCP

## 2023-09-07 RX ORDER — PROPRANOLOL HYDROCHLORIDE 10 MG/1
10 TABLET ORAL 2 TIMES DAILY PRN
Qty: 60 TABLET | Refills: 2 | Status: SHIPPED | OUTPATIENT
Start: 2023-09-07 | End: 2023-09-25 | Stop reason: SINTOL

## 2023-09-25 ENCOUNTER — OFFICE VISIT (OUTPATIENT)
Dept: BEHAVIORAL HEALTH | Facility: PSYCHIATRIC FACILITY | Age: 28
End: 2023-09-25

## 2023-09-25 VITALS
OXYGEN SATURATION: 97 % | DIASTOLIC BLOOD PRESSURE: 73 MMHG | WEIGHT: 200 LBS | HEART RATE: 77 BPM | HEIGHT: 68 IN | SYSTOLIC BLOOD PRESSURE: 122 MMHG | BODY MASS INDEX: 30.31 KG/M2

## 2023-09-25 DIAGNOSIS — F41.1 GENERALIZED ANXIETY DISORDER: ICD-10-CM

## 2023-09-25 DIAGNOSIS — F42.9 OBSESSIVE-COMPULSIVE DISORDER, UNSPECIFIED TYPE: ICD-10-CM

## 2023-09-25 PROCEDURE — 3074F SYST BP LT 130 MM HG: CPT

## 2023-09-25 PROCEDURE — 99214 OFFICE O/P EST MOD 30 MIN: CPT | Mod: GE

## 2023-09-25 PROCEDURE — 3078F DIAST BP <80 MM HG: CPT

## 2023-09-25 NOTE — PROGRESS NOTES
[facility]  Medication Management Note    Evaluation completed by: Jourdan Antonio D.O.   Date of Service: 9/25/23   Appointment type: in-office appointment.    Information below was collected from: patient    Special language or communication needs: No  Responded to any questions about patient rights: No  Reviewed limits of confidentiality: Yes  Confidentiality: The patient was informed that her medical records are confidential except for use by the treatment team in this clinic and others involved in her care.  Records may be shared with outside entities if the patient signs a release of information.  Information may be shared with appropriate authorities without a release of information to report instances of child/elder abuse or if it is determined she is in imminent risk of harm to self or others.     REASON FOR VISIT  Follow up anxiety     HISTORY OF PRESENT ILLNESS  Cathy Spencer is a 26 y.o. old female who presents today for follow up management of generalized anxiety disorder.     She reports feeling down, as of late. She attributes this to a recent illness and fatigue brought on by the propranolol. She has since stopped taking it and the lack of energy has improved. Regarding the Y-BOCS, she did not remember to bring the completed documents to the clinic today. After discussion, it appears that obsessions are related to harm that she may do to herself or others (while driving). As such, she shies away from driving when she can. She also has fear of saying certain things, fear of not saying just the right thing in addition to intrusive sounds, words or music. Compulsions manifest in frequent checking. She spends between 1-3 hours a day being occupied by obsessive thoughts. They do interfere with social and/or occupational performance, but manageable. They are distressing, but also manageable. She is sometimes able to stop or divert the obsessions.     CURRENT MEDICATIONS,  "ADHERENCE, AND SIDE EFFECTS   Zoloft 200 mg qdaily  Buspar 30 mg bid    MEDICAL REVIEW OF SYSTEMS  Review of Systems   Constitutional: Negative.    HENT: Negative.     Eyes: Negative.    Respiratory: Negative.     Cardiovascular: Negative.    Gastrointestinal: Negative.    Psychiatric/Behavioral:  The patient is nervous/anxious.       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[employer], currently working on thesis     MEDICAL HISTORY    Past Medical History   Past Medical History:  No date: Anxiety  No date: Bowel habit changes      Comment:  constipation  No date: Depression  No date: Heart burn  No date: Indigestion  12/21/2018: Pain      Comment:  back and abd., 1-2/10      Past Surgical History         Past Surgical History:   Procedure Laterality Date    GABO BY LAPAROSCOPY N/A 12/26/2018     Procedure: GABO BY LAPAROSCOPY;  Surgeon: Gene Covarrubias M.D.;  [facility]: SURGERY [facility];  Service: General    OTHER SURGICAL PROCEDURE   08/2018     EGD            PHYSICAL EXAMINATION  Vital signs: There were no vitals taken for this visit.  Musculoskeletal: Gait is normal. No gross abnormalities noted.   Abnormal movements: none     MENTAL STATUS EXAMINATION    General: Cathy Spencer appears stated age and exhibits grooming which is appropriate, casual, and neat.  Hygiene is good.     Behavior: Pt is calm and cooperative with interview.  No apparent distress.  Eye contact is appropriate.   Psychomotor: Psychomotor agitation or retardation not noted.  Tics or tremors not noted.  Speech: rate within normal limits and volume within normal limits  Language: fluent english  Mood: \"okay\"  Affect: Appears anxious, restricted range   Thought Process: Logical and Goal-directed  Thought Content: denies suicidal ideation, denies homicidal " "ideation.   Perception: denies auditory hallucinations, denies visual hallucinations. No delusions noted on interview.   Attention span and concentration: good  Orientation: Alert and Fully Oriented  Recent and remote memory: No gross evidence of memory deficits  Insight: Good  Judgment: Good     SAFETY ASSESSMENT - RISK TO SELF  Current suicide attempts or self harm: No  Past suicide attempts or self harm: No  History of suicide by family member: No  History of suicide by friend/significant other: No  Recent change in amount/specificity/intensity of suicidal thoughts or self-harm behavior: yes, but situated on recent event with other . No current SI.   Ongoing substance use disorder: No  Current access to firearms, medications, or other identified means of suicide/self-harm: No  If yes, willing to restrict access to means of suicide/self-harm: N/a  Protective factors present: Yes     SAFETY ASSESSMENT - RISK TO OTHERS  Current aggressive behavior or risk to others: No  Past aggressive behavior or risk to others: No  Recent change in amount/specificity/intensity of thoughts or threats to harm others? No  Current access to firearms/other identified means of harm? No  If yes, willing to restrict access to weapons/means of harm? N/a     CURRENT RISK ASSESSMENT       Suicide: Low       Homicide: Low       Self-Harm: Low       Relapse: Low     ASSESSMENT  Cathy Spencer is a 26 y.o. old female presenting for follow up appointment for anxiety. Pt self-discontinued the propranolol due to adverse effects. She did complete the Y-BOCS screening for OCD but did not bring it to clinic today. After discussion, it appears that she suffers from obsessions regarding fear of harm to self, others, in addition to multiple in the \"miscellaneous\" category. These include having a fear of saying certain things, fear of not saying just the right thing, in addition to intrusive sounds, words or music. Compulsions manifest in " frequent checking. She spends between 1-3 hours a day being occupied by obsessive thoughts. They do interfere with social and/or occupational performance, but manageable. Given the content, length of time in which she spends preoccupied with the thoughts and degree of functional impairment, Pt would meet the criteria for OCD. This may explain her anxiety's degree of resistance to max dose of Zoloft.     Discussed with Pt the potential to increase the Zoloft dosing beyond the FDA regulations to target OCD. Generally, studies have shown OCD response to Zoloft in the 200-300 mg range. Pt has no personal cardiac hx. She is agreeable to the plan. Will plan to increase the dose to 225 mg. If further increases are necessary, will plan to obtain an EKG and continue monitoring of Qtc interval. Consider discussing cognitive behavioral therapy for OCD/.    DIAGNOSES/PLAN  Problem 1: OCD  Medications: INCREASE dose of Zoloft to 225 mg    Problem 2: Generalized Anxiety Disorder  Medications: Zoloft 225 mg qdaily. Buspar 30 mg bid.  Psychotherapy: undergoing weekly therapy     Medication options, alternatives (including no medications) and medication risks/benefits/side effects were discussed in detail.    The patient was advised to call, message clinician on [employer], or come in to the clinic if symptoms worsen or if questions/issues regarding their medications arise.  The patient verbalized understanding and agreement.       The patient was educated to call 911, call the suicide hotline, or go to the local ER if having thoughts of suicide or homicide.  The patient verbalized understanding and agreement.      The proposed treatment plan was discussed with the patient who was provided the opportunity to ask questions and make suggestions regarding alternative treatment. Patient verbalized understanding and expressed agreement with the plan.      Return to clinic in 4 weeks or sooner if symptoms worsen.     This appointment was  supervised by attending psychiatrist, Dr. Coleman, who agrees with assessment and treatment plan. See attending attestation for more details.      Jourdan Antonio D.O.  9/25/23

## 2023-10-30 ENCOUNTER — OFFICE VISIT (OUTPATIENT)
Dept: BEHAVIORAL HEALTH | Facility: PSYCHIATRIC FACILITY | Age: 28
End: 2023-10-30

## 2023-10-30 DIAGNOSIS — F41.1 GENERALIZED ANXIETY DISORDER: ICD-10-CM

## 2023-10-30 DIAGNOSIS — F42.9 OBSESSIVE-COMPULSIVE DISORDER, UNSPECIFIED TYPE: ICD-10-CM

## 2023-10-30 PROCEDURE — 99213 OFFICE O/P EST LOW 20 MIN: CPT | Mod: GE

## 2023-10-30 NOTE — PROGRESS NOTES
[facility]  Medication Management Note    Evaluation completed by: Jourdan Antonio D.O.   Date of Service: 10/30/23   Appointment type: in-office appointment.    Information below was collected from: patient    Special language or communication needs: No  Responded to any questions about patient rights: No  Reviewed limits of confidentiality: Yes  Confidentiality: The patient was informed that her medical records are confidential except for use by the treatment team in this clinic and others involved in her care.  Records may be shared with outside entities if the patient signs a release of information.  Information may be shared with appropriate authorities without a release of information to report instances of child/elder abuse or if it is determined she is in imminent risk of harm to self or others.     REASON FOR VISIT  Follow up anxiety     HISTORY OF PRESENT ILLNESS  Cathy Spencer is a 26 y.o. old female who presents today for follow up management of generalized anxiety disorder.     Since the increase in the dose of Zoloft to 225 mg, Pt feels that she has been getting through the day without feeling like she needs another medication.  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  Patient finds that she has been working late into the night -as late as 3 AM.  She states that she is more productive in the late evening and has trouble regulating her schedule.  Endorses difficulty being productive during the day.  Typically, she wakes up around 9 AM.  As of late, she has not been feeling well rested.  Constantly feeling fatigued.  Discussed restructuring of schedule to accommodate for better sleep and self-care.  She reports feeling less anxious.  Denies experiencing anhedonia, typically plays video games with her older brother on a regular basis.  She does admit to avoiding meetings out of fear that she will say something  "incorrect.  No thoughts of self-harm, death or suicide.  No side effects with the increase in Zoloft dosing.    CURRENT MEDICATIONS, ADHERENCE, AND SIDE EFFECTS   Zoloft 225 mg qdaily  Buspar 30 mg bid    MEDICAL REVIEW OF SYSTEMS  Review of Systems   Constitutional: Negative.    HENT: Negative.     Eyes: Negative.    Respiratory: Negative.     Cardiovascular: Negative.    Gastrointestinal: Negative.    Psychiatric/Behavioral:  The patient is nervous/anxious.       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;  Service: General    OTHER SURGICAL PROCEDURE   08/2018     EGD            PHYSICAL EXAMINATION  Vital signs: There were no vitals taken for this visit.  Musculoskeletal: Gait is normal. No gross abnormalities noted.   Abnormal movements: none     MENTAL STATUS EXAMINATION    General: Cathy Spencer appears stated age and exhibits grooming which is appropriate, casual, and neat.  Hygiene is good.     Behavior: Pt is calm and cooperative with interview.  No apparent distress.  Eye contact is appropriate.   Psychomotor: Psychomotor agitation or retardation not noted.  Tics or tremors not noted.  Speech: rate within normal limits and volume within normal limits  Language: fluent english  Mood: \"Better\"  Affect: " Appears anxious, restricted range, a brief moment of reactivity  Thought Process: Logical and Goal-directed  Thought Content: denies suicidal ideation, denies homicidal ideation.   Perception: denies auditory hallucinations, denies visual hallucinations. No delusions noted on interview.   Attention span and concentration: good  Orientation: Alert and Fully Oriented  Recent and remote memory: No gross evidence of memory deficits  Insight: Good  Judgment: Good     SAFETY ASSESSMENT - RISK TO SELF  Current suicide attempts or self harm: No  Past suicide attempts or self harm: No  History of suicide by family member: No  History of suicide by friend/significant other: No  Recent change in amount/specificity/intensity of suicidal thoughts or self-harm behavior: yes, but situated on recent event with other . No current SI.   Ongoing substance use disorder: No  Current access to firearms, medications, or other identified means of suicide/self-harm: No  If yes, willing to restrict access to means of suicide/self-harm: N/a  Protective factors present: Yes     SAFETY ASSESSMENT - RISK TO OTHERS  Current aggressive behavior or risk to others: No  Past aggressive behavior or risk to others: No  Recent change in amount/specificity/intensity of thoughts or threats to harm others? No  Current access to firearms/other identified means of harm? No  If yes, willing to restrict access to weapons/means of harm? N/a     CURRENT RISK ASSESSMENT       Suicide: Low       Homicide: Low       Self-Harm: Low       Relapse: Low     ASSESSMENT  Cathy Spencer is a 26 y.o. old female presenting for follow up appointment for anxiety.  Patient has noticed a slight improvement in obsessional thoughts since increasing the dose of Zoloft to 225 mg 4 weeks ago.  We will order EKG to monitor QTc interval.  Depending on results, may continue to cautiously increase the dose to target OCD symptoms.  Consider discussing cognitive behavioral  therapy for OCD.  Anxiety appears to be related to the work on her thesis.  Working to restructure schedule to accommodate for increased productivity, better sleep and self-care.  Symptoms may lucas with further progress on her thesis.  We will continue to monitor.    DIAGNOSES/PLAN  Problem 1: OCD  Medications: Continue Zoloft to 225 mg  EKG ordered    Problem 2: Generalized Anxiety Disorder  Medications: Zoloft 225 mg qdaily. Buspar 30 mg bid.  Psychotherapy: undergoing weekly therapy     Medication options, alternatives (including no medications) and medication risks/benefits/side effects were discussed in detail.    The patient was advised to call, message clinician on [employer], or come in to the clinic if symptoms worsen or if questions/issues regarding their medications arise.  The patient verbalized understanding and agreement.       The patient was educated to call 911, call the suicide hotline, or go to the local ER if having thoughts of suicide or homicide.  The patient verbalized understanding and agreement.      The proposed treatment plan was discussed with the patient who was provided the opportunity to ask questions and make suggestions regarding alternative treatment. Patient verbalized understanding and expressed agreement with the plan.      Return to clinic in 4 weeks or sooner if symptoms worsen.     This appointment was supervised by attending psychiatrist, Dr. Coleman, who agrees with assessment and treatment plan. See attending attestation for more details.      Jourdan Antonio D.O.

## 2023-11-27 ENCOUNTER — OFFICE VISIT (OUTPATIENT)
Dept: BEHAVIORAL HEALTH | Facility: PSYCHIATRIC FACILITY | Age: 28
End: 2023-11-27

## 2023-11-27 DIAGNOSIS — F42.9 OBSESSIVE-COMPULSIVE DISORDER, UNSPECIFIED TYPE: ICD-10-CM

## 2023-11-27 PROCEDURE — 99213 OFFICE O/P EST LOW 20 MIN: CPT | Mod: GE

## 2023-11-27 RX ORDER — SERTRALINE HYDROCHLORIDE 100 MG/1
200 TABLET, FILM COATED ORAL DAILY
Qty: 60 TABLET | Refills: 3 | Status: SHIPPED | OUTPATIENT
Start: 2023-11-27 | End: 2024-03-04 | Stop reason: SDUPTHER

## 2023-11-27 NOTE — PROGRESS NOTES
"[facility]  Medication Management Note    Evaluation completed by: Jourdan Antonio D.O.   Date of Service: 11/27/23  Appointment type: in-office appointment.    Information below was collected from: patient    Special language or communication needs: No  Responded to any questions about patient rights: No  Reviewed limits of confidentiality: Yes  Confidentiality: The patient was informed that her medical records are confidential except for use by the treatment team in this clinic and others involved in her care.  Records may be shared with outside entities if the patient signs a release of information.  Information may be shared with appropriate authorities without a release of information to report instances of child/elder abuse or if it is determined she is in imminent risk of harm to self or others.     REASON FOR VISIT  Follow up anxiety and OCD    HISTORY OF PRESENT ILLNESS  Cathy Spencer is a 27 y.o. old female who presents today for follow up management of generalized anxiety disorder and OCD.     Mood is \"good\", remains stable. Anxiety has been heightened as a result of a neighbor's lack of boundaries. No panic attacks. She has approached management with the issue but is disappointed by their response. Pt is trying to ignore said neighbor. Work on thesis has been progressing at a good pace; she has been feeling motivated. Obsession thought processes has seen some improvement with increase in Zoloft. Sx of fatigue are ongoing despite rearranging schedule to be front-loaded during the day. No thoughts of death or suicide.     CURRENT MEDICATIONS, ADHERENCE, AND SIDE EFFECTS   Zoloft 225 mg qdaily  Buspar 30 mg bid    MEDICAL REVIEW OF SYSTEMS  Review of Systems   Constitutional: Negative.    HENT: Negative.     Eyes: Negative.    Respiratory: Negative.     Cardiovascular: Negative.    Gastrointestinal: Negative.    Psychiatric/Behavioral:  The patient is nervous/anxious.     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[employer], currently working on thesis     MEDICAL HISTORY    Past Medical History   Past Medical History:  No date: Anxiety  No date: Bowel habit changes      Comment:  constipation  No date: Depression  No date: Heart burn  No date: Indigestion  12/21/2018: Pain      Comment:  back and abd., 1-2/10      Past Surgical History         Past Surgical History:   Procedure Laterality Date    GABO BY LAPAROSCOPY N/A 12/26/2018     Procedure: GABO BY LAPAROSCOPY;  Surgeon: Gene Covarrubias M.D.;  Location: SURGERY [facility];  Service: General    OTHER SURGICAL PROCEDURE   08/2018     EGD            PHYSICAL EXAMINATION  Vital signs: There were no vitals taken for this visit.  Musculoskeletal: Gait is normal. No gross abnormalities noted.   Abnormal movements: none     MENTAL STATUS EXAMINATION    General: Cathy Spencer appears stated age and exhibits grooming which is appropriate, casual, and neat.  Hygiene is good.     Behavior: Pt is calm and cooperative with interview.  No apparent distress.  Eye contact is appropriate.   Psychomotor: Psychomotor agitation or retardation not noted.  Tics or tremors not noted.  Speech: rate within normal limits and volume within normal limits  Language: fluent english  Mood: \"good\"  Affect: restricted range, some reactivity, anxious  Thought Process: Logical and Goal-directed  Thought Content: denies suicidal ideation, denies homicidal ideation.   Perception: denies auditory hallucinations, denies visual hallucinations. No delusions noted on interview.   Attention span and concentration: good  Orientation: Alert and Fully Oriented  Recent and remote memory: No gross evidence of memory deficits  Insight: Good  Judgment: Good     SAFETY ASSESSMENT - RISK TO " SELF  Current suicide attempts or self harm: No  Past suicide attempts or self harm: No  History of suicide by family member: No  History of suicide by friend/significant other: No  Recent change in amount/specificity/intensity of suicidal thoughts or self-harm behavior: yes, but situated on recent event with other . No current SI.   Ongoing substance use disorder: No  Current access to firearms, medications, or other identified means of suicide/self-harm: No  If yes, willing to restrict access to means of suicide/self-harm: N/a  Protective factors present: Yes     SAFETY ASSESSMENT - RISK TO OTHERS  Current aggressive behavior or risk to others: No  Past aggressive behavior or risk to others: No  Recent change in amount/specificity/intensity of thoughts or threats to harm others? No  Current access to firearms/other identified means of harm? No  If yes, willing to restrict access to weapons/means of harm? N/a     CURRENT RISK ASSESSMENT       Suicide: Low       Homicide: Low       Self-Harm: Low       Relapse: Low     ASSESSMENT  Cathy Spencer is a 27 y.o. old female presenting for follow up appointment for anxiety.  Patient has noticed an improvement in obsessional thoughts since increasing the dose of Zoloft to 225 mg.  We will order EKG to monitor QTc interval.  Depending on results, may continue to cautiously increase the dose to target OCD symptoms.  Consider discussing cognitive behavioral therapy for OCD. Sx of fatigue are ongoing. Will order CBC, CMP, TSH and Vitamin D.    DIAGNOSES/PLAN  Problem 1: OCD  Medications: Continue Zoloft 225 mg  EKG, CBC, CMP, TSH and Vitamin D ordered    Problem 2: Generalized Anxiety Disorder  Medications: Zoloft 225 mg qdaily. Buspar 30 mg bid.  Psychotherapy: undergoing weekly therapy     Medication options, alternatives (including no medications) and medication risks/benefits/side effects were discussed in detail.    The patient was advised to call, message  clinician on MyChart, or come in to the clinic if symptoms worsen or if questions/issues regarding their medications arise.  The patient verbalized understanding and agreement.       The patient was educated to call 911, call the suicide hotline, or go to the local ER if having thoughts of suicide or homicide.  The patient verbalized understanding and agreement.      The proposed treatment plan was discussed with the patient who was provided the opportunity to ask questions and make suggestions regarding alternative treatment. Patient verbalized understanding and expressed agreement with the plan.      Return to clinic in 7 weeks or sooner if symptoms worsen.     This appointment was supervised by attending psychiatrist who agrees with assessment and treatment plan. See attending attestation for more details.      Jourdan Antonio D.O.

## 2023-12-09 ENCOUNTER — APPOINTMENT (OUTPATIENT)
Dept: LAB | Facility: MEDICAL CENTER | Age: 28
End: 2023-12-09

## 2024-01-22 ENCOUNTER — OFFICE VISIT (OUTPATIENT)
Dept: BEHAVIORAL HEALTH | Facility: PSYCHIATRIC FACILITY | Age: 29
End: 2024-01-22
Payer: COMMERCIAL

## 2024-01-22 VITALS
HEART RATE: 81 BPM | WEIGHT: 200.6 LBS | DIASTOLIC BLOOD PRESSURE: 64 MMHG | HEIGHT: 67 IN | BODY MASS INDEX: 31.48 KG/M2 | OXYGEN SATURATION: 94 % | SYSTOLIC BLOOD PRESSURE: 135 MMHG

## 2024-01-22 DIAGNOSIS — F33.1 MODERATE EPISODE OF RECURRENT MAJOR DEPRESSIVE DISORDER (HCC): ICD-10-CM

## 2024-01-22 PROCEDURE — 3075F SYST BP GE 130 - 139MM HG: CPT

## 2024-01-22 PROCEDURE — 99213 OFFICE O/P EST LOW 20 MIN: CPT | Mod: GE

## 2024-01-22 PROCEDURE — 3078F DIAST BP <80 MM HG: CPT

## 2024-01-22 RX ORDER — BUPROPION HYDROCHLORIDE 150 MG/1
150 TABLET ORAL EVERY MORNING
Qty: 30 TABLET | Refills: 3 | Status: SHIPPED | OUTPATIENT
Start: 2024-01-22

## 2024-01-22 ASSESSMENT — PATIENT HEALTH QUESTIONNAIRE - PHQ9
7. TROUBLE CONCENTRATING ON THINGS, SUCH AS READING THE NEWSPAPER OR WATCHING TELEVISION: NEARLY EVERY DAY
1. LITTLE INTEREST OR PLEASURE IN DOING THINGS: MORE THAN HALF THE DAYS
5. POOR APPETITE OR OVEREATING: MORE THAN HALF THE DAYS
9. THOUGHTS THAT YOU WOULD BE BETTER OFF DEAD, OR OF HURTING YOURSELF: SEVERAL DAYS
2. FEELING DOWN, DEPRESSED, IRRITABLE, OR HOPELESS: MORE THAN HALF THE DAYS
4. FEELING TIRED OR HAVING LITTLE ENERGY: NEARLY EVERY DAY
3. TROUBLE FALLING OR STAYING ASLEEP OR SLEEPING TOO MUCH: NEARLY EVERY DAY
6. FEELING BAD ABOUT YOURSELF - OR THAT YOU ARE A FAILURE OR HAVE LET YOURSELF OR YOUR FAMILY DOWN: MORE THAN HALF THE DAYS
SUM OF ALL RESPONSES TO PHQ QUESTIONS 1-9: 21
8. MOVING OR SPEAKING SO SLOWLY THAT OTHER PEOPLE COULD HAVE NOTICED. OR THE OPPOSITE, BEING SO FIGETY OR RESTLESS THAT YOU HAVE BEEN MOVING AROUND A LOT MORE THAN USUAL: NEARLY EVERY DAY
SUM OF ALL RESPONSES TO PHQ9 QUESTIONS 1 AND 2: 4

## 2024-01-22 NOTE — PROGRESS NOTES
[facility]  Medication Management Note    Evaluation completed by: Jourdan Antonio D.O.   Date of Service: 1/22/24  Appointment type: in-office appointment.    Information below was collected from: patient    Special language or communication needs: No  Responded to any questions about patient rights: No  Reviewed limits of confidentiality: Yes  Confidentiality: The patient was informed that her medical records are confidential except for use by the treatment team in this clinic and others involved in her care.  Records may be shared with outside entities if the patient signs a release of information.  Information may be shared with appropriate authorities without a release of information to report instances of child/elder abuse or if it is determined she is in imminent risk of harm to self or others.     REASON FOR VISIT  Follow up anxiety and OCD    HISTORY OF PRESENT ILLNESS  Cathy Spencer is a 28 y.o. old female who presents today for follow up management of generalized anxiety disorder and OCD.     Semester started up today, should be her last semester. 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 Has 50 of a 100 pages completed. Plans to visit [employer] regularly to be more productive. Her animals can be distracting.     PHQ-9 of 21 today. Endorses depression, anhedonia, low energy, poor sleep, lack of concentration and psychomotor retardation. Reports an instance of SI two days ago. No intent or plan. No such thoughts today.     She finds herself cleaning her apartment, typically at night, in an attempt to make up for her lack of productivity on her thesis during the day. Lately, this has been impacting sleep.    Labs completed this morning; results not back yet. Was not able to do EKG.     CURRENT MEDICATIONS, ADHERENCE, AND SIDE EFFECTS   Zoloft 225 mg qdaily  Buspar 30 mg bid    MEDICAL REVIEW OF SYSTEMS  Review of Systems   Constitutional: Negative.    HENT:  "Negative.     Eyes: Negative.    Respiratory: Negative.     Cardiovascular: Negative.    Gastrointestinal: Negative.    Psychiatric/Behavioral:  The patient is nervous/anxious.       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[email or web address], currently working on thesis     MEDICAL HISTORY    Past Medical History   Past Medical History:  No date: Anxiety  No date: Bowel habit changes      Comment:  constipation  No date: Depression  No date: Heart burn  No date: Indigestion  12/21/2018: Pain      Comment:  back and abd., 1-2/10      Past Surgical History         Past Surgical History:   Procedure Laterality Date    GABO BY LAPAROSCOPY N/A 12/26/2018     Procedure: GABO BY LAPAROSCOPY;  Surgeon: Gene Covarrubias M.D.;  Location: SURGERY [facility];  Service: General    OTHER SURGICAL PROCEDURE   08/2018     EGD            PHYSICAL EXAMINATION  Vital signs: There were no vitals taken for this visit.  Musculoskeletal: Gait is normal. No gross abnormalities noted.   Abnormal movements: none     MENTAL STATUS EXAMINATION    General: Cathy Spencer appears stated age and exhibits grooming which is appropriate, casual, and neat.  Hygiene is good.     Behavior: Pt is calm and cooperative with interview.  No apparent distress.  Eye contact is appropriate.   Psychomotor: Psychomotor agitation or retardation not noted.  Tics or tremors not noted.  Speech: rate within normal limits and volume within normal limits  Language: fluent english  Mood: \"not good\"  Affect: dysthymic, restricted range, some reactivity, anxious  Thought Process: Logical and Goal-directed  Thought Content: denies suicidal ideation, denies homicidal ideation.   Perception: denies auditory hallucinations, denies visual hallucinations. No delusions noted on interview.   Attention " span and concentration: good  Orientation: Alert and Fully Oriented  Recent and remote memory: No gross evidence of memory deficits  Insight: Good  Judgment: Good     SAFETY ASSESSMENT - RISK TO SELF  Current suicide attempts or self harm: No  Past suicide attempts or self harm: No  History of suicide by family member: No  History of suicide by friend/significant other: No  Recent change in amount/specificity/intensity of suicidal thoughts or self-harm behavior: instance of suicidal thoughts two days ago; no thoughts today. No intent or plan.   Ongoing substance use disorder: No  Current access to firearms, medications, or other identified means of suicide/self-harm: No  If yes, willing to restrict access to means of suicide/self-harm: N/a  Protective factors present: Yes     SAFETY ASSESSMENT - RISK TO OTHERS  Current aggressive behavior or risk to others: No  Past aggressive behavior or risk to others: No  Recent change in amount/specificity/intensity of thoughts or threats to harm others? No  Current access to firearms/other identified means of harm? No  If yes, willing to restrict access to weapons/means of harm? N/a     CURRENT RISK ASSESSMENT       Suicide: Low       Homicide: Low       Self-Harm: Low       Relapse: Low     ASSESSMENT  Cathy Spencer is a 28 y.o. old female presenting for follow up appointment for anxiety, OCD and depression. PHQ-9 of 21 today. Sx of depression and anxiety are largely tied to incomplete thesis. Working to make behavioral changes to increase productivity. Will plan to screen for ADHD; often comorbid with JONNY and OCD. Due to the severity of depression, will plan to augment with bupropion XR 150mg. Possibility that this may worsen anxiety/obsessional thoughts; discussed with Pt. Discussed with Pt that EKG will need to be completed. Results pending for labs ordered. No acute safety concerns. RTC in 4 weeks.     DIAGNOSES/PLAN    Problem 1: Major Depressive Disorder,  moderate, recurrent  Medications: Continue Zoloft 225mg, START bupropion XR 150mg for augmentation     Problem 2: OCD  Medications: Continue Zoloft 225 mg  EKG, CBC, CMP, TSH and Vitamin D results pending    Problem 3: Generalized Anxiety Disorder  Medications: Zoloft 225 mg qdaily. Buspar 30 mg bid.  Psychotherapy: undergoing weekly therapy     Medication options, alternatives (including no medications) and medication risks/benefits/side effects were discussed in detail.    The patient was advised to call, message clinician on [employer], or come in to the clinic if symptoms worsen or if questions/issues regarding their medications arise.  The patient verbalized understanding and agreement.       The patient was educated to call 911, call the suicide hotline, or go to the local ER if having thoughts of suicide or homicide.  The patient verbalized understanding and agreement.      The proposed treatment plan was discussed with the patient who was provided the opportunity to ask questions and make suggestions regarding alternative treatment. Patient verbalized understanding and expressed agreement with the plan.      Return to clinic in 4 weeks or sooner if symptoms worsen.     This appointment was supervised by attending psychiatrist who agrees with assessment and treatment plan. See attending attestation for more details.      Jourdan Antonio D.O.

## 2024-03-04 ENCOUNTER — OFFICE VISIT (OUTPATIENT)
Dept: BEHAVIORAL HEALTH | Facility: PSYCHIATRIC FACILITY | Age: 29
End: 2024-03-04
Payer: COMMERCIAL

## 2024-03-04 DIAGNOSIS — F33.1 MODERATE EPISODE OF RECURRENT MAJOR DEPRESSIVE DISORDER (HCC): ICD-10-CM

## 2024-03-04 DIAGNOSIS — F42.9 OBSESSIVE-COMPULSIVE DISORDER, UNSPECIFIED TYPE: ICD-10-CM

## 2024-03-04 DIAGNOSIS — F42.2 MIXED OBSESSIONAL THOUGHTS AND ACTS: ICD-10-CM

## 2024-03-04 DIAGNOSIS — F41.1 GENERALIZED ANXIETY DISORDER: ICD-10-CM

## 2024-03-04 PROCEDURE — 99213 OFFICE O/P EST LOW 20 MIN: CPT | Mod: GE

## 2024-03-04 RX ORDER — SERTRALINE HYDROCHLORIDE 100 MG/1
200 TABLET, FILM COATED ORAL DAILY
Qty: 60 TABLET | Refills: 3 | Status: SHIPPED | OUTPATIENT
Start: 2024-03-04
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
## 2024-04-15 ENCOUNTER — OFFICE VISIT (OUTPATIENT)
Dept: BEHAVIORAL HEALTH | Facility: PSYCHIATRIC FACILITY | Age: 29
End: 2024-04-15
Payer: COMMERCIAL

## 2024-04-15 VITALS
HEART RATE: 74 BPM | OXYGEN SATURATION: 96 % | BODY MASS INDEX: 31.77 KG/M2 | DIASTOLIC BLOOD PRESSURE: 70 MMHG | WEIGHT: 199.8 LBS | SYSTOLIC BLOOD PRESSURE: 118 MMHG

## 2024-04-15 DIAGNOSIS — F41.1 GENERALIZED ANXIETY DISORDER: ICD-10-CM

## 2024-04-15 DIAGNOSIS — F33.1 MODERATE EPISODE OF RECURRENT MAJOR DEPRESSIVE DISORDER (HCC): ICD-10-CM

## 2024-04-15 PROCEDURE — 99214 OFFICE O/P EST MOD 30 MIN: CPT | Mod: GC

## 2024-04-15 PROCEDURE — 3078F DIAST BP <80 MM HG: CPT

## 2024-04-15 PROCEDURE — 3074F SYST BP LT 130 MM HG: CPT

## 2024-04-15 RX ORDER — BUPROPION HYDROCHLORIDE 300 MG/1
300 TABLET ORAL EVERY MORNING
Qty: 30 TABLET | Refills: 3 | Status: SHIPPED | OUTPATIENT
Start: 2024-04-15

## 2024-04-15 ASSESSMENT — FIBROSIS 4 INDEX: FIB4 SCORE: 0.46

## 2024-04-15 NOTE — PROGRESS NOTES
"[facility]  Medication Management Note    Evaluation completed by: Jourdan Antonio D.O.   Date of Service: 4/15/24  Appointment type: in-office appointment.    Information below was collected from: patient    Special language or communication needs: No  Responded to any questions about patient rights: No  Reviewed limits of confidentiality: Yes  Confidentiality: The patient was informed that her medical records are confidential except for use by the treatment team in this clinic and others involved in her care.  Records may be shared with outside entities if the patient signs a release of information.  Information may be shared with appropriate authorities without a release of information to report instances of child/elder abuse or if it is determined she is in imminent risk of harm to self or others.     REASON FOR VISIT  Follow up depression, anxiety and OCD    HISTORY OF PRESENT ILLNESS  Cathy Spencer is a 28 y.o. old female who presents today for follow up management of depression, generalized anxiety disorder and OCD.     Reports mood as \"good\" today, describes it as being more stable since starting the bupropion. She has also noticed that she is better able to cope with stressors. 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 Did note that it was initially disappointing but did state that she was able to cope with it. She does continue to endorse passive SI, but does note that the frequency of these thoughts has decreased. She has had one panic attack in the last month.     She has now moved to a new apartment. She has also completed the first draft of her thesis. Regarding symptoms of inattention, describes inability to sustain attention, constant distractibility, and issues with organization. She has difficulty keeping responsibilities ie appointments, meetings and is often misplacing objects. She reports avoiding mentally " taxing tasks. Her brother has a diagnosis of ADHD and is medicated with stimulants.     CURRENT MEDICATIONS, ADHERENCE, AND SIDE EFFECTS   Zoloft 200 mg qdaily  Bupropion XR 150mg daily - adjunct for depression  Buspar 30 mg bid    MEDICAL REVIEW OF SYSTEMS  Review of Systems   Constitutional: Negative.    HENT: Negative.     Eyes: Negative.    Respiratory: Negative.     Cardiovascular: Negative.    Gastrointestinal: Negative.    Psychiatric/Behavioral:  The patient is nervous/anxious.       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[employer], currently working on thesis     MEDICAL HISTORY    Past Medical History   Past Medical History:  No date: Anxiety  No date: Bowel habit changes      Comment:  constipation  No date: Depression  No date: Heart burn  No date: Indigestion  12/21/2018: Pain      Comment:  back and abd., 1-2/10      Past Surgical History         Past Surgical History:   Procedure Laterality Date    GABO BY LAPAROSCOPY N/A 12/26/2018     Procedure: GABO BY LAPAROSCOPY;  Surgeon: Gene Covarrubias M.D.;  Location: SURGERY [facility];  Service: General    OTHER SURGICAL PROCEDURE   08/2018     EGD            PHYSICAL EXAMINATION  Vital signs: There were no vitals taken for this visit.  Musculoskeletal: Gait is normal. No gross abnormalities noted.   Abnormal movements: none     MENTAL STATUS EXAMINATION    General: Cathy Spencer appears stated age and exhibits grooming which is appropriate, casual, and neat.  Hygiene is good.     Behavior: Pt is calm and cooperative with interview.  No apparent distress.  Eye contact is appropriate.   Psychomotor: Psychomotor agitation or retardation not noted.  Tics or tremors not noted.  Speech: rate within normal limits and volume within normal limits  Language: fluent  "english  Mood: \"good\"  Affect: euthymic, full range, congruent with mood  Thought Process: Logical and Goal-directed  Thought Content: endorses chronic SI, no intent or plan, denies homicidal ideation.   Perception: denies auditory hallucinations, denies visual hallucinations. No delusions noted on interview.   Attention span and concentration: fair  Orientation: Alert and Fully Oriented  Recent and remote memory: No gross evidence of memory deficits  Insight: Good  Judgment: Good     CURRENT RISK ASSESSMENT       Suicide: Low       Homicide: Low       Self-Harm: Low       Relapse: Low     ASSESSMENT  Cathy Spencer is a 28 y.o. old female presenting for follow up appointment for anxiety, OCD and depression. Sx of both depression and anxiety have seen a benefit with the addition of bupropion. She does continue to endorse intermittent thoughts of hopelessness and passive SI. Will plan to increase the dose of bupropion XR to 300mg.     There are sx of ADHD, inattentive type present. Unclear as to if these symptoms did occur during childhood. Will continue to explore. Regardless, further optimization of bupropion would be helpful as comorbid depression and anxiety may also be confounding the diagnostic picture and contributing to sx of inattention.     DIAGNOSES/PLAN    Problem 1: Major Depressive Disorder, moderate to severe, recurrent  Medications: Continue Zoloft 200mg, INCREASE bupropion XR to 300mg for augmentation     Problem 2: Generalized Anxiety Disorder  Medications: Zoloft 200 mg qdaily. Buspar 30 mg bid.  Psychotherapy: undergoing weekly therapy    Problem 3: Hx of OCD    Problem 4: Rule out ADHD     Medication options, alternatives (including no medications) and medication risks/benefits/side effects were discussed in detail.    The patient was advised to call, [facility], or come in to the clinic if symptoms worsen or if questions/issues regarding their medications arise.  The " patient verbalized understanding and agreement.       The patient was educated to call 911, call the suicide hotline, or go to the local ER if having thoughts of suicide or homicide.  The patient verbalized understanding and agreement.      The proposed treatment plan was discussed with the patient who was provided the opportunity to ask questions and make suggestions regarding alternative treatment. Patient verbalized understanding and expressed agreement with the plan.      Return to clinic in 5 weeks or sooner if symptoms worsen.     This appointment was supervised by attending psychiatrist who agrees with assessment and treatment plan. See attending attestation for more details.      Jourdan Antonio D.O.

## 2024-05-20 ENCOUNTER — OFFICE VISIT (OUTPATIENT)
Dept: BEHAVIORAL HEALTH | Facility: PSYCHIATRIC FACILITY | Age: 29
End: 2024-05-20
Payer: COMMERCIAL

## 2024-05-20 DIAGNOSIS — F41.1 GENERALIZED ANXIETY DISORDER: ICD-10-CM

## 2024-05-20 DIAGNOSIS — F33.1 MODERATE EPISODE OF RECURRENT MAJOR DEPRESSIVE DISORDER (HCC): ICD-10-CM

## 2024-05-20 PROCEDURE — 99213 OFFICE O/P EST LOW 20 MIN: CPT

## 2024-05-20 RX ORDER — BUPROPION HYDROCHLORIDE 150 MG/1
450 TABLET ORAL EVERY MORNING
Qty: 90 TABLET | Refills: 3 | Status: SHIPPED | OUTPATIENT
Start: 2024-05-20

## 2024-05-20 RX ORDER — BUSPIRONE HYDROCHLORIDE 30 MG/1
30 TABLET ORAL 2 TIMES DAILY
Qty: 60 TABLET | Refills: 3 | Status: SHIPPED | OUTPATIENT
Start: 2024-05-20 | End: 2024-09-17

## 2024-05-20 NOTE — PROGRESS NOTES
[facility]  Medication Management Note    Evaluation completed by: Jourdan Antonio D.O.   Date of Service: 5/20/24  Appointment type: in-office appointment.    Information below was collected from: patient    Special language or communication needs: No  Responded to any questions about patient rights: No  Reviewed limits of confidentiality: Yes  Confidentiality: The patient was informed that her medical records are confidential except for use by the treatment team in this clinic and others involved in her care.  Records may be shared with outside entities if the patient signs a release of information.  Information may be shared with appropriate authorities without a release of information to report instances of child/elder abuse or if it is determined she is in imminent risk of harm to self or others.     REASON FOR VISIT  Follow up depression, anxiety and OCD    HISTORY OF PRESENT ILLNESS  Cathy Spencer is a 28 y.o. old female who presents today for follow up management of depression, generalized anxiety disorder and OCD.     Ability to deal with mood and anxiety has improved with the increase in bupropion dose to 300mg. She did report anxiety and mood sx with commencement celebrations last week knowing that she was not going to graduate. States that she was largely in bed, devoid of energy and had little motivation to engage in anything besides basic ADLs. Has since accepted that she is not going to graduate and reports improvement in these symptoms. No thoughts of death or suicide.     During childhood, says that she often would hyperfocus on her interests. She struggled with distractibility and inattention. She was in gifted classes but anxiety was a source of her productivity.     CURRENT MEDICATIONS, ADHERENCE, AND SIDE EFFECTS   Zoloft 200 mg qdaily  Bupropion XR 300mg daily - adjunct for depression  Buspar 30 mg bid    MEDICAL REVIEW OF SYSTEMS  Review of Systems  "  Constitutional: Negative.    HENT: Negative.     Eyes: Negative.    Respiratory: Negative.     Cardiovascular: Negative.    Gastrointestinal: Negative.    Psychiatric/Behavioral:  The patient is nervous/anxious.       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[email or web address], currently working on thesis     MEDICAL HISTORY    Past Medical History   Past Medical History:  No date: Anxiety  No date: Bowel habit changes      Comment:  constipation  No date: Depression  No date: Heart burn  No date: Indigestion  12/21/2018: Pain      Comment:  back and abd., 1-2/10      Past Surgical History         Past Surgical History:   Procedure Laterality Date    GABO BY LAPAROSCOPY N/A 12/26/2018     Procedure: GABO BY LAPAROSCOPY;  Surgeon: Gene Covarrubias M.D.;  Location: SURGERY [facility];  Service: General    OTHER SURGICAL PROCEDURE   08/2018     EGD            PHYSICAL EXAMINATION  Vital signs: There were no vitals taken for this visit.  Musculoskeletal: Gait is normal. No gross abnormalities noted.   Abnormal movements: none     MENTAL STATUS EXAMINATION    General: Cathy Spencer appears stated age and exhibits grooming which is appropriate, casual, and neat.  Hygiene is good.     Behavior: Pt is calm and cooperative with interview.  No apparent distress.  Eye contact is appropriate.   Psychomotor: Psychomotor agitation or retardation not noted.  Tics or tremors not noted.  Speech: rate within normal limits and volume within normal limits  Language: fluent english  Mood: \"down\"  Affect: dysthymic, restricted range, congruent with mood  Thought Process: Logical and Goal-directed  Thought Content: no SI, HI  Perception: denies auditory hallucinations, denies visual hallucinations. No delusions noted on interview.   Attention span and " concentration: fair  Orientation: Alert and Fully Oriented  Recent and remote memory: No gross evidence of memory deficits  Insight: Good  Judgment: Good     CURRENT RISK ASSESSMENT       Suicide: Low       Homicide: Low       Self-Harm: Low       Relapse: Low     ASSESSMENT  Cathy Spencer is a 28 y.o. old female presenting for follow up appointment for anxiety, OCD and depression. Ability to deal with mood and anxiety has improved with the increase in bupropion dose to 300mg. She did report anxiety and mood sx with commencement celebrations last week knowing that she was not going to graduate. Has since accepted this fact and reports improvement in these symptoms.     Regarding symptoms of inattention, describes inability to sustain attention, constant distractibility, and issues with organization. She has difficulty keeping responsibilities ie appointments, meetings and is often misplacing objects. She reports avoiding mentally taxing tasks. Her brother has a diagnosis of ADHD and is medicated with stimulants. These symptoms did have origin during childhood. Was in gifted classes but it is likely that anxiety drove her to meet expectations. Given the current treatment with bupropion, will increase dose to 450mg and assess. If ADHD sx continue, would prefer to wean off bupropion and start treatment with a stimulant. It's likely that untreated ADHD is significantly contributing to mood and symptoms of anxiety.     DIAGNOSES/PLAN  Problem 1: Major Depressive Disorder, moderate to severe, recurrent  Medications: Continue Zoloft 200mg, INCREASE bupropion XR to 450mg for augmentation and ADHD    Problem 2: Generalized Anxiety Disorder  Medications: Zoloft 200 mg qdaily. Buspar 30 mg bid.  Psychotherapy: undergoing weekly therapy    Problem 3: ADHD, inattentive type   INCREASE bupropion XR to 450mg for augmentation and ADHD    Problem 4: Hx of OCD       Medication options, alternatives (including no  medications) and medication risks/benefits/side effects were discussed in detail.    The patient was advised to call, message clinician on MyChart, or come in to the clinic if symptoms worsen or if questions/issues regarding their medications arise.  The patient verbalized understanding and agreement.       The patient was educated to call 911, call the suicide hotline, or go to the local ER if having thoughts of suicide or homicide.  The patient verbalized understanding and agreement.      The proposed treatment plan was discussed with the patient who was provided the opportunity to ask questions and make suggestions regarding alternative treatment. Patient verbalized understanding and expressed agreement with the plan.      Return to clinic in 8 weeks or sooner if symptoms worsen.     This appointment was supervised by attending psychiatrist who agrees with assessment and treatment plan. See attending attestation for more details.      Jourdan Antonio D.O.

## 2024-07-15 ENCOUNTER — OFFICE VISIT (OUTPATIENT)
Dept: BEHAVIORAL HEALTH | Facility: PSYCHIATRIC FACILITY | Age: 29
End: 2024-07-15
Payer: COMMERCIAL

## 2024-07-15 DIAGNOSIS — F33.1 MODERATE EPISODE OF RECURRENT MAJOR DEPRESSIVE DISORDER (HCC): ICD-10-CM

## 2024-07-15 DIAGNOSIS — F42.9 OBSESSIVE-COMPULSIVE DISORDER, UNSPECIFIED TYPE: ICD-10-CM

## 2024-07-15 DIAGNOSIS — F90.0 ADHD (ATTENTION DEFICIT HYPERACTIVITY DISORDER), INATTENTIVE TYPE: ICD-10-CM

## 2024-07-15 DIAGNOSIS — F41.1 GENERALIZED ANXIETY DISORDER: ICD-10-CM

## 2024-07-15 PROCEDURE — 99214 OFFICE O/P EST MOD 30 MIN: CPT

## 2024-07-15 RX ORDER — DEXTROAMPHETAMINE SACCHARATE, AMPHETAMINE ASPARTATE MONOHYDRATE, DEXTROAMPHETAMINE SULFATE AND AMPHETAMINE SULFATE 1.25; 1.25; 1.25; 1.25 MG/1; MG/1; MG/1; MG/1
CAPSULE, EXTENDED RELEASE ORAL
Qty: 21 CAPSULE | Refills: 0 | Status: SHIPPED | OUTPATIENT
Start: 2024-07-15 | End: 2024-07-29

## 2024-07-15 RX ORDER — BUPROPION HYDROCHLORIDE 150 MG/1
450 TABLET ORAL EVERY MORNING
Qty: 90 TABLET | Refills: 3 | Status: SHIPPED | OUTPATIENT
Start: 2024-07-15

## 2024-07-15 RX ORDER — SERTRALINE HYDROCHLORIDE 100 MG/1
200 TABLET, FILM COATED ORAL DAILY
Qty: 60 TABLET | Refills: 3 | Status: SHIPPED | OUTPATIENT
Start: 2024-07-15

## 2024-07-15 RX ORDER — BUSPIRONE HYDROCHLORIDE 30 MG/1
TABLET ORAL
Qty: 180 TABLET | Refills: 2 | Status: SHIPPED | OUTPATIENT
Start: 2024-07-15

## 2024-07-15 ASSESSMENT — ENCOUNTER SYMPTOMS
MUSCULOSKELETAL NEGATIVE: 1
RESPIRATORY NEGATIVE: 1
GASTROINTESTINAL NEGATIVE: 1
WEIGHT LOSS: 0
NEUROLOGICAL NEGATIVE: 1
DOUBLE VISION: 0
CARDIOVASCULAR NEGATIVE: 1

## 2024-07-29 DIAGNOSIS — F90.0 ADHD (ATTENTION DEFICIT HYPERACTIVITY DISORDER), INATTENTIVE TYPE: ICD-10-CM

## 2024-07-29 RX ORDER — DEXTROAMPHETAMINE SACCHARATE, AMPHETAMINE ASPARTATE MONOHYDRATE, DEXTROAMPHETAMINE SULFATE AND AMPHETAMINE SULFATE 2.5; 2.5; 2.5; 2.5 MG/1; MG/1; MG/1; MG/1
10 CAPSULE, EXTENDED RELEASE ORAL EVERY MORNING
Qty: 30 CAPSULE | Refills: 0 | Status: SHIPPED | OUTPATIENT
Start: 2024-07-29 | End: 2024-08-28

## 2024-08-12 ENCOUNTER — OFFICE VISIT (OUTPATIENT)
Dept: BEHAVIORAL HEALTH | Facility: PSYCHIATRIC FACILITY | Age: 29
End: 2024-08-12
Payer: COMMERCIAL

## 2024-08-12 VITALS
OXYGEN SATURATION: 95 % | SYSTOLIC BLOOD PRESSURE: 116 MMHG | BODY MASS INDEX: 32.33 KG/M2 | DIASTOLIC BLOOD PRESSURE: 80 MMHG | HEART RATE: 89 BPM | WEIGHT: 206 LBS | HEIGHT: 67 IN

## 2024-08-12 DIAGNOSIS — F90.0 ADHD (ATTENTION DEFICIT HYPERACTIVITY DISORDER), INATTENTIVE TYPE: ICD-10-CM

## 2024-08-12 DIAGNOSIS — F42.9 OBSESSIVE-COMPULSIVE DISORDER, UNSPECIFIED TYPE: ICD-10-CM

## 2024-08-12 DIAGNOSIS — F41.1 GENERALIZED ANXIETY DISORDER: ICD-10-CM

## 2024-08-12 DIAGNOSIS — F33.1 MODERATE EPISODE OF RECURRENT MAJOR DEPRESSIVE DISORDER (HCC): ICD-10-CM

## 2024-08-12 PROCEDURE — 3079F DIAST BP 80-89 MM HG: CPT

## 2024-08-12 PROCEDURE — 99214 OFFICE O/P EST MOD 30 MIN: CPT

## 2024-08-12 PROCEDURE — 3074F SYST BP LT 130 MM HG: CPT

## 2024-08-12 RX ORDER — DEXTROAMPHETAMINE SACCHARATE, AMPHETAMINE ASPARTATE MONOHYDRATE, DEXTROAMPHETAMINE SULFATE AND AMPHETAMINE SULFATE 2.5; 2.5; 2.5; 2.5 MG/1; MG/1; MG/1; MG/1
10 CAPSULE, EXTENDED RELEASE ORAL EVERY MORNING
Qty: 30 CAPSULE | Refills: 0 | Status: SHIPPED | OUTPATIENT
Start: 2024-08-29 | End: 2024-09-28

## 2024-08-12 RX ORDER — BUPROPION HYDROCHLORIDE 300 MG/1
300 TABLET ORAL EVERY MORNING
Qty: 30 TABLET | Refills: 2 | Status: SHIPPED | OUTPATIENT
Start: 2024-08-12

## 2024-08-12 ASSESSMENT — ENCOUNTER SYMPTOMS
CARDIOVASCULAR NEGATIVE: 1
NEUROLOGICAL NEGATIVE: 1
WEIGHT LOSS: 0
MUSCULOSKELETAL NEGATIVE: 1
DOUBLE VISION: 0
RESPIRATORY NEGATIVE: 1
GASTROINTESTINAL NEGATIVE: 1

## 2024-08-12 ASSESSMENT — FIBROSIS 4 INDEX: FIB4 SCORE: 0.46

## 2024-08-12 NOTE — PROGRESS NOTES
[facility] Psychiatric Follow Up Note  Evaluation completed by: Jourdan Antonio D.O.   Date of Service: 08/12/2024  Appointment type: in-office appointment.  Attending:  Rangel Menon M.D.   Information below was collected from: patient    CHIEF COMPLIANT:  Medication Management (For MDD, ADHD, JONNY and OCD. )    HPI:   Cathy Spencer is a 28 y.o. old female who presents today for regularly scheduled follow up for assessment of Medication Management (For MDD, ADHD, JONNY and OCD. )    Generic Adderall extended release was started at the previous visit. Provided a 7 day script for both 5mg and 10mg to compare the efficacy of the two doses. Did notice that there was a change in level of productivity, easier to ignore distractions and stay focused on a singular task. She did not note a significant difference between both doses, but did state that her level of anxiety has been gradually building as time progresses towards the new semester.     Taking medication around noon, discussed earlier dosing in the morning. Endorsed lack of appetite, forgetting to eat leading to light headedness. No sleep impairment. No increase in anxiety. No heart palpitations.    Regular weekly therapy.     PSYCHIATRIC REVIEW OF SYSTEMS:current symptoms as reported by pt.  Depression: Denies depressed mood or anhedonia  Xochitl: Patient denies any change in mood, increased energy, or marked irritability  Anxiety/Panic Attacks: insomnia, difficulty concentrating, restlessness, irritable, muscle tension   Psychosis: Patient reports no signs or symptoms indicative of psychosis  ADHD: see HPI    CURRENT MEDICATIONS    Current Outpatient Medications:     buPROPion (WELLBUTRIN XL) 300 MG XL tablet, Take 1 Tablet by mouth every morning., Disp: 30 Tablet, Rfl: 2    [START ON 8/29/2024] amphetamine-dextroamphetamine XR (ADDERALL XR, 10MG,) 10 MG CAPSULE SR 24 HR, Take 1 Capsule by mouth every morning for 30 days., Disp: 30  Capsule, Rfl: 0    busPIRone (BUSPAR) 30 MG tablet, TAKE 1 TABLET BY MOUTH 2 TIMES A DAY  DAYS., Disp: 180 Tablet, Rfl: 2    sertraline (ZOLOFT) 100 MG Tab, Take 2 Tablets by mouth every day., Disp: 60 Tablet, Rfl: 3    loratadine (CLARITIN) 10 MG Tab, Take 10 mg by mouth every day., Disp: , Rfl:      REVIEW OF SYSTEMS   Review of Systems   Constitutional:  Negative for malaise/fatigue and weight loss.   Eyes:  Negative for double vision.   Respiratory: Negative.     Cardiovascular: Negative.    Gastrointestinal: Negative.    Musculoskeletal: Negative.    Skin:  Negative for rash.   Neurological: Negative.      Neurologic: no tics, tremors, dyskinesias. The patient denies dizzniess, syncope, falls. Ambulates independently    PAST MEDICAL HISTORY  Past Medical History:   Diagnosis Date    Anxiety     Bowel habit changes     constipation    Depression     Heart burn     Indigestion     Pain 12/21/2018    back and abd., 1-2/10     No Known Allergies  Past Surgical History:   Procedure Laterality Date    GABO BY LAPAROSCOPY N/A 12/26/2018    Procedure: GABO BY LAPAROSCOPY;  Surgeon: Gene Covarrubias M.D.;  Location: SURGERY [facility];  Service: General    OTHER SURGICAL PROCEDURE  08/2018    EGD      Family History   Problem Relation Age of Onset    Arthritis Mother     Psychiatric Illness Mother     Diabetes Father     Stroke Father     Heart Disease Father     Psychiatric Illness Other     Bipolar disorder Other      Social History     Socioeconomic History    Marital status: Single   Tobacco Use    Smoking status: Never    Smokeless tobacco: Never   Substance and Sexual Activity    Alcohol use: Yes     Comment: 2/month    Drug use: Yes     Types: Marijuana, Inhaled, Oral     Comment: marijuana last used 12/23     Past Surgical History:   Procedure Laterality Date    GABO BY LAPAROSCOPY N/A 12/26/2018    Procedure: GABO BY LAPAROSCOPY;  Surgeon: Gene Covarrubias M.D.;  [facility] SURGERY [facility]  [facility];  Service: General    OTHER SURGICAL PROCEDURE  08/2018    EGD       PSYCHIATRIC EXAMINATION   /80 (BP Location: Left arm, Patient Position: Sitting, BP Cuff Size: Adult)   Pulse 89   Ht 1.689 m (5' 6.5\")   Wt 93.4 kg (206 lb)   SpO2 95%   BMI 32.75 kg/m²   Musculoskeletal: No abnormal movements noted  Appearance: well-developed, well-nourished, appears stated age, fair hygiene, no apparent distress, and appropriately dressed, cooperative, engaged, friendly, pleasant, and good eye contact  Thought Process:  linear, coherent, goal-oriented, and organized  Abnormal or Psychotic Thoughts: No evidence of auditory or visual hallucinations, and no overt delusions noted  Speech: regular rate, rhythm, volume, tone, and syntax and normal tone  Mood: \"ok\"  Affect: euthymic and restricted, anxious  SI/HI: Denies SI and HI  Orientation: alert and oriented  Recent and Remote Memory: no gross impairment in immediate, recent, or remote memory  Attention Span and Concentration:  Insight/Judgement into symptoms: fair and good  Neurological Testing (MSSE Score and/or clock drawing): MMSE not performed during this encounter    SCREENINGS:      10/19/2021    11:05 AM 4/19/2022     9:30 AM 1/22/2024    11:52 AM   Depression Screen (PHQ-2/PHQ-9)   PHQ-2 Total Score 0  4   PHQ-2 Total Score  1    PHQ-9 Total Score 6  21   PHQ-9 Total Score  9           PREVENTATIVE CARE  Medication Monitoring: Stimulants: Reviewed height, weight, blood pressure, and pulse.      [place]  records   reviewed.  No concerns about misuse of controlled substance.    CURRENT RISK ASSESSMENT       Suicide: Low       Homicide: Low       Self-Harm: Low       Relapse: Low       Crisis Safety Plan Reviewed Not Indicated    ASSESSMENT/DIAGNOSES/PLAN  Problem List Items Addressed This Visit          Psychiatry Problems    Moderate episode of recurrent major depressive disorder (HCC)     Improving, will continue to monitor as bupropion dose " (adjunct) decreases. Closely tied to work on thesis and poor self-esteem.          Relevant Medications    buPROPion (WELLBUTRIN XL) 300 MG XL tablet    Generalized anxiety disorder     Continue sertraline 200mg. Undergoing weekly therapy. Encouraging behavioral intervention.          Relevant Medications    buPROPion (WELLBUTRIN XL) 300 MG XL tablet    OCD (obsessive compulsive disorder)    Relevant Medications    buPROPion (WELLBUTRIN XL) 300 MG XL tablet    ADHD (attention deficit hyperactivity disorder), inattentive type     Regarding symptoms of inattention, previously described inability to sustain attention, constant distractibility, and issues with organization. She has difficulty keeping responsibilities ie appointments, meetings and is often misplacing objects. She reports avoiding mentally taxing tasks. Her brother has a diagnosis of ADHD and is medicated with stimulants. These symptoms did have origin during childhood. Was in gifted classes but it is likely that anxiety drove her to meet expectations. Continued to endorse symptoms of inattention and distractibility despite 450mg dose of Wellbutrin. Was provided prescriptions for 5 and 10mg Adderall XR to compare the doses. She did note a benefit in ability to concentrate and minimize distractions. As she was noting an increase in anxiety (semester is approaching), will decrease bupropion to 300mg to minimize noradrenergic effects from regimen. Will continue Adderall XR at 10mg. No concerns on PDMP. Will plan to discuss lowering of seizure threshold at upcoming appointments, no concerns thus far.                Relevant Medications    amphetamine-dextroamphetamine XR (ADDERALL XR, 10MG,) 10 MG CAPSULE SR 24 HR (Start on 8/29/2024)      Medication options, alternatives (including no medications) and medication risks/benefits/side effects were discussed in detail.  The patient was advised to call, message clinician on [place], or come in to the clinic if  symptoms worsen or if questions/issues regarding their medications arise.  The patient verbalized understanding and agreement.    The patient was educated to call 911, call the suicide hotline, or go to the local ER if having thoughts of suicide or homicide.  The patient verbalized understanding and agreement.   The proposed treatment plan was discussed with the patient who was provided the opportunity to ask questions and make suggestions regarding alternative treatment. Patient verbalized understanding and expressed agreement with the plan.      Follow up in 6 weeks.     This appointment was supervised by attending psychiatrist, Rangel Menon M.D. , who agrees with assessment and treatment plan.  See attending attestation for more details.

## 2024-08-13 NOTE — ASSESSMENT & PLAN NOTE
Improving, will continue to monitor as bupropion dose (adjunct) decreases. Closely tied to work on thesis and poor self-esteem.

## 2024-08-13 NOTE — ASSESSMENT & PLAN NOTE
Regarding symptoms of inattention, previously described inability to sustain attention, constant distractibility, and issues with organization. She has difficulty keeping responsibilities ie appointments, meetings and is often misplacing objects. She reports avoiding mentally taxing tasks. Her brother has a diagnosis of ADHD and is medicated with stimulants. These symptoms did have origin during childhood. Was in gifted classes but it is likely that anxiety drove her to meet expectations. Continued to endorse symptoms of inattention and distractibility despite 450mg dose of Wellbutrin. Was provided prescriptions for 5 and 10mg Adderall XR to compare the doses. She did note a benefit in ability to concentrate and minimize distractions. As she was noting an increase in anxiety (semester is approaching), will decrease bupropion to 300mg to minimize noradrenergic effects from regimen. Will continue Adderall XR at 10mg. No concerns on PDMP. Will plan to discuss lowering of seizure threshold at upcoming appointments, no concerns thus far.

## 2024-09-23 ENCOUNTER — OFFICE VISIT (OUTPATIENT)
Dept: BEHAVIORAL HEALTH | Facility: PSYCHIATRIC FACILITY | Age: 29
End: 2024-09-23
Payer: COMMERCIAL

## 2024-09-23 VITALS
BODY MASS INDEX: 32.96 KG/M2 | HEIGHT: 67 IN | DIASTOLIC BLOOD PRESSURE: 72 MMHG | HEART RATE: 76 BPM | OXYGEN SATURATION: 96 % | WEIGHT: 210 LBS | SYSTOLIC BLOOD PRESSURE: 114 MMHG

## 2024-09-23 DIAGNOSIS — F41.1 GENERALIZED ANXIETY DISORDER: ICD-10-CM

## 2024-09-23 DIAGNOSIS — F33.41 RECURRENT MAJOR DEPRESSIVE DISORDER, IN PARTIAL REMISSION (HCC): ICD-10-CM

## 2024-09-23 DIAGNOSIS — F90.0 ADHD (ATTENTION DEFICIT HYPERACTIVITY DISORDER), INATTENTIVE TYPE: ICD-10-CM

## 2024-09-23 PROBLEM — F33.1 MODERATE EPISODE OF RECURRENT MAJOR DEPRESSIVE DISORDER (HCC): Status: RESOLVED | Noted: 2021-10-19 | Resolved: 2024-09-23

## 2024-09-23 PROCEDURE — 3078F DIAST BP <80 MM HG: CPT

## 2024-09-23 PROCEDURE — 3074F SYST BP LT 130 MM HG: CPT

## 2024-09-23 PROCEDURE — 99214 OFFICE O/P EST MOD 30 MIN: CPT

## 2024-09-23 ASSESSMENT — ENCOUNTER SYMPTOMS
WEIGHT LOSS: 0
RESPIRATORY NEGATIVE: 1
NEUROLOGICAL NEGATIVE: 1
DOUBLE VISION: 0
GASTROINTESTINAL NEGATIVE: 1
CARDIOVASCULAR NEGATIVE: 1
MUSCULOSKELETAL NEGATIVE: 1

## 2024-09-23 ASSESSMENT — FIBROSIS 4 INDEX: FIB4 SCORE: 0.46

## 2024-09-23 NOTE — ASSESSMENT & PLAN NOTE
Improving with treatment of ADHD. Less negative self-talk. Greater self-worth. Will continue current regimen. No concerns for self-harm and/or suicide. 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 In the future, would prefer to consolidate her medication regimen ie wean off bupropion as the stimulant and resulting sense of productivity is likely benefiting her mood to a greater degree than the medication in question.

## 2024-09-23 NOTE — PROGRESS NOTES
"[facility] Outpatient Psychiatric Follow Up Note  Evaluation completed by: Jourdan Antonio D.O.   Date of Service: 09/23/2024  Appointment type: in-office appointment.  Attending:  Rangel Menon M.D.   Information below was collected from: patient    CHIEF COMPLIANT:  Medication Management (Generalized Anxiety Disorder, ADHD inattentive type)    HPI:   Cathy Spencer is a 28 y.o. old female who presents today for regularly scheduled follow up for assessment of Medication Management (Generalized Anxiety Disorder, ADHD inattentive type)    Feels that her mood has been stable despite the stress. Describes herself as \"more functional\". Less brain fog and negative self-talk. Anxiety has also seen a reduction. Less panic attacks (maybe once a month compared to prior which it was occurring in a weekly basis). Less rumination. No thoughts of self-harm and/or suicide. Still in weekly therapy.     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 Takes the stimulant around 10am, usually waking up around 8am but waits to take it until she eats. Sometimes this leads to interference with lunch ie not eating until 2pm or until a \"hypoglycemic crash\". Discussed shifting schedule to working on the thesis in the mornings rather than working through the night. This would allow for a regular routine, improvement in sleep and time for her to engage in self-care.     PSYCHIATRIC REVIEW OF SYSTEMS:current symptoms as reported by pt.  Depression: Denies depressed mood or anhedonia  Xochitl: Patient denies any change in mood, increased energy, or marked irritability  Anxiety/Panic Attacks: Denies any anxiety associated symptoms  Psychosis: Patient reports no signs or symptoms indicative of psychosis  ADHD: has difficulty organizing tasks and activities, minimizing distractions    CURRENT MEDICATIONS    Current Outpatient Medications:     amphetamine-dextroamphetamine XR (ADDERALL XR, 10MG,) 10 MG CAPSULE SR 24 HR, Take 1 Capsule by " mouth every morning for 30 days., Disp: 30 Capsule, Rfl: 0    [START ON 10/25/2024] amphetamine-dextroamphetamine XR (ADDERALL XR, 10MG,) 10 MG CAPSULE SR 24 HR, Take 1 Capsule by mouth every morning for 30 days., Disp: 30 Capsule, Rfl: 0    buPROPion (WELLBUTRIN XL) 300 MG XL tablet, Take 1 Tablet by mouth every morning., Disp: 30 Tablet, Rfl: 2    busPIRone (BUSPAR) 30 MG tablet, TAKE 1 TABLET BY MOUTH 2 TIMES A DAY  DAYS., Disp: 180 Tablet, Rfl: 2    sertraline (ZOLOFT) 100 MG Tab, Take 2 Tablets by mouth every day., Disp: 60 Tablet, Rfl: 3    loratadine (CLARITIN) 10 MG Tab, Take 10 mg by mouth every day., Disp: , Rfl:      REVIEW OF SYSTEMS   Review of Systems   Constitutional:  Negative for malaise/fatigue and weight loss.   Eyes:  Negative for double vision.   Respiratory: Negative.     Cardiovascular: Negative.    Gastrointestinal: Negative.    Musculoskeletal: Negative.    Skin:  Negative for rash.   Neurological: Negative.      Neurologic: no tics, tremors, dyskinesias. The patient denies dizzniess, syncope, falls. Ambulates independently    PAST MEDICAL HISTORY  Past Medical History:   Diagnosis Date    Anxiety     Bowel habit changes     constipation    Depression     Heart burn     Indigestion     Pain 12/21/2018    back and abd., 1-2/10     No Known Allergies  Past Surgical History:   Procedure Laterality Date    GABO BY LAPAROSCOPY N/A 12/26/2018    Procedure: GABO BY LAPAROSCOPY;  Surgeon: Gene Covarrubias M.D.;  Location: SURGERY [facility];  Service: General    OTHER SURGICAL PROCEDURE  08/2018    EGD      Family History   Problem Relation Age of Onset    Arthritis Mother     Psychiatric Illness Mother     Diabetes Father     Stroke Father     Heart Disease Father     Psychiatric Illness Other     Bipolar disorder Other      Social History     Socioeconomic History    Marital status: Single   Tobacco Use    Smoking status: Never    Smokeless tobacco: Never   Substance and Sexual  "Activity    Alcohol use: Yes     Comment: 2/month    Drug use: Yes     Types: Marijuana, Inhaled, Oral     Comment: marijuana last used 12/23     Past Surgical History:   Procedure Laterality Date    GABO BY LAPAROSCOPY N/A 12/26/2018    Procedure: GABO BY LAPAROSCOPY;  Surgeon: Geen Covarrubias M.D.;  Location: SURGERY [facility];  Service: General    OTHER SURGICAL PROCEDURE  08/2018    EGD       PSYCHIATRIC EXAMINATION   /72 (BP Location: Left arm, Patient Position: Sitting, BP Cuff Size: Adult)   Pulse 76   Ht 1.689 m (5' 6.5\")   Wt 95.3 kg (210 lb)   SpO2 96%   BMI 33.39 kg/m²   Musculoskeletal: No abnormal movements noted  Appearance: well-developed, well-nourished, appears stated age, no apparent distress, and appropriately dressed, cooperative, engaged, friendly, pleasant, and good eye contact. Noticeably less anxious today.   Thought Process:  linear, goal-oriented, and organized  Abnormal or Psychotic Thoughts: No evidence of auditory or visual hallucinations, and no overt delusions noted  Speech: regular rate, rhythm, volume, tone, and syntax and normal tone  Mood: \"good\"  Affect: euthymic and congruent with mood  SI/HI: Denies SI and HI  Orientation: alert and oriented  Recent and Remote Memory: no gross impairment in immediate, recent, or remote memory  Attention Span and Concentration: no apparent deficits noted  Insight/Judgement into symptoms: poor and good  Neurological Testing (MSSE Score and/or clock drawing): MMSE not performed during this encounter    SCREENINGS:      10/19/2021    11:05 AM 4/19/2022     9:30 AM 1/22/2024    11:52 AM   Depression Screen (PHQ-2/PHQ-9)   PHQ-2 Total Score 0  4   PHQ-2 Total Score  1    PHQ-9 Total Score 6  21   PHQ-9 Total Score  9      PREVENTATIVE CARE  Medication Monitoring: Stimulants: Reviewed height, weight, blood pressure, and pulse.      [place]  records   reviewed.  No concerns about misuse of controlled substance.    CURRENT RISK " ASSESSMENT       Suicide: Low       Homicide: Low       Self-Harm: Low       Relapse: Low       Crisis Safety Plan Reviewed Not Indicated    ASSESSMENT/DIAGNOSES/PLAN  Problem List Items Addressed This Visit          Psychiatry Problems    Generalized anxiety disorder     Continue sertraline 200mg. Undergoing weekly therapy. Encouraging behavioral intervention.          ADHD (attention deficit hyperactivity disorder), inattentive type     Regarding symptoms of inattention, previously described inability to sustain attention, constant distractibility, and issues with organization. She has difficulty keeping responsibilities ie appointments, meetings and is often misplacing objects. She reports avoiding mentally taxing tasks. Her brother has a diagnosis of ADHD and is medicated with stimulants. These symptoms did have origin during childhood. Was in gifted classes but it is likely that anxiety drove her to meet expectations. Reports improvement in her ability to focus on tasks, finds herself more productive and is having less negative self-talk. Will continue medication stimulant regimen. Will instead to reinforce behavioral interventions ie organizational skills. No concerns on PDMP. Will plan to discuss lowering of seizure threshold at upcoming appointments, no concerns thus far.                Relevant Medications    amphetamine-dextroamphetamine XR (ADDERALL XR, 10MG,) 10 MG CAPSULE SR 24 HR    amphetamine-dextroamphetamine XR (ADDERALL XR, 10MG,) 10 MG CAPSULE SR 24 HR (Start on 10/25/2024)    Recurrent major depressive disorder, in partial remission (HCC)     Improving with treatment of ADHD. Less negative self-talk. Greater self-worth. Will continue current regimen. No concerns for self-harm and/or suicide. 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 In the future, would prefer to consolidate her medication regimen ie wean off  bupropion as the stimulant and resulting sense of productivity is likely benefiting her mood to a greater degree than the medication in question.            Medication options, alternatives (including no medications) and medication risks/benefits/side effects were discussed in detail.  The patient was advised to call, message clinician on [employer], or come in to the clinic if symptoms worsen or if questions/issues regarding their medications arise.  The patient verbalized understanding and agreement.    The patient was educated to call 911, call the suicide hotline, or go to the local ER if having thoughts of suicide or homicide.  The patient verbalized understanding and agreement.   The proposed treatment plan was discussed with the patient who was provided the opportunity to ask questions and make suggestions regarding alternative treatment. Patient verbalized understanding and expressed agreement with the plan.      Follow up in 7 weeks    This appointment was supervised by attending psychiatrist, Rangel Menon M.D. , who agrees with assessment and treatment plan.  See attending attestation for more details.

## 2024-09-24 RX ORDER — DEXTROAMPHETAMINE SACCHARATE, AMPHETAMINE ASPARTATE MONOHYDRATE, DEXTROAMPHETAMINE SULFATE AND AMPHETAMINE SULFATE 2.5; 2.5; 2.5; 2.5 MG/1; MG/1; MG/1; MG/1
10 CAPSULE, EXTENDED RELEASE ORAL EVERY MORNING
Qty: 30 CAPSULE | Refills: 0 | Status: SHIPPED | OUTPATIENT
Start: 2024-10-25 | End: 2024-11-24

## 2024-09-24 RX ORDER — DEXTROAMPHETAMINE SACCHARATE, AMPHETAMINE ASPARTATE MONOHYDRATE, DEXTROAMPHETAMINE SULFATE AND AMPHETAMINE SULFATE 2.5; 2.5; 2.5; 2.5 MG/1; MG/1; MG/1; MG/1
10 CAPSULE, EXTENDED RELEASE ORAL EVERY MORNING
Qty: 30 CAPSULE | Refills: 0 | Status: SHIPPED | OUTPATIENT
Start: 2024-09-24 | End: 2024-10-24

## 2024-09-24 NOTE — ASSESSMENT & PLAN NOTE
Regarding symptoms of inattention, previously described inability to sustain attention, constant distractibility, and issues with organization. She has difficulty keeping responsibilities ie appointments, meetings and is often misplacing objects. She reports avoiding mentally taxing tasks. Her brother has a diagnosis of ADHD and is medicated with stimulants. These symptoms did have origin during childhood. Was in gifted classes but it is likely that anxiety drove her to meet expectations. Reports improvement in her ability to focus on tasks, finds herself more productive and is having less negative self-talk. Will continue medication stimulant regimen. Will instead to reinforce behavioral interventions ie organizational skills. No concerns on PDMP. Will plan to discuss lowering of seizure threshold at upcoming appointments, no concerns thus far.

## 2024-11-11 ENCOUNTER — TELEPHONE (OUTPATIENT)
Dept: BEHAVIORAL HEALTH | Facility: PSYCHIATRIC FACILITY | Age: 29
End: 2024-11-11
Payer: COMMERCIAL

## 2024-11-11 DIAGNOSIS — F41.1 GENERALIZED ANXIETY DISORDER: ICD-10-CM

## 2024-11-11 DIAGNOSIS — F90.0 ADHD (ATTENTION DEFICIT HYPERACTIVITY DISORDER), INATTENTIVE TYPE: ICD-10-CM

## 2024-11-11 DIAGNOSIS — F33.1 MODERATE EPISODE OF RECURRENT MAJOR DEPRESSIVE DISORDER (HCC): ICD-10-CM

## 2024-11-11 DIAGNOSIS — F42.9 OBSESSIVE-COMPULSIVE DISORDER, UNSPECIFIED TYPE: ICD-10-CM

## 2024-11-11 RX ORDER — SERTRALINE HYDROCHLORIDE 100 MG/1
200 TABLET, FILM COATED ORAL DAILY
Qty: 60 TABLET | Refills: 3 | Status: SHIPPED | OUTPATIENT
Start: 2024-11-11

## 2024-11-11 RX ORDER — BUPROPION HYDROCHLORIDE 300 MG/1
300 TABLET ORAL EVERY MORNING
Qty: 30 TABLET | Refills: 2 | Status: SHIPPED | OUTPATIENT
Start: 2024-11-11 | End: 2024-11-25 | Stop reason: SDUPTHER

## 2024-11-11 RX ORDER — BUSPIRONE HYDROCHLORIDE 30 MG/1
30 TABLET ORAL 2 TIMES DAILY
Qty: 180 TABLET | Refills: 2 | Status: SHIPPED | OUTPATIENT
Start: 2024-11-11

## 2024-11-11 NOTE — TELEPHONE ENCOUNTER
Called Pt after she was late to her appointment today. She endorses an increase in anxiety going into the defense of her thesis next week. Discussed that this was normal, especially after being out of her stimulant for a few days. This resulted in a decrease in her sense of productivity, further increasing her anxiety. She has since resumed her stimulant. She is now scheduled for a follow up on 11/25. Provided refills on bupropion, buspirone and sertraline.

## 2024-11-25 ENCOUNTER — OFFICE VISIT (OUTPATIENT)
Dept: BEHAVIORAL HEALTH | Facility: PSYCHIATRIC FACILITY | Age: 29
End: 2024-11-25
Payer: COMMERCIAL

## 2024-11-25 VITALS
OXYGEN SATURATION: 95 % | SYSTOLIC BLOOD PRESSURE: 108 MMHG | HEIGHT: 67 IN | HEART RATE: 94 BPM | WEIGHT: 220 LBS | BODY MASS INDEX: 34.53 KG/M2 | DIASTOLIC BLOOD PRESSURE: 70 MMHG

## 2024-11-25 DIAGNOSIS — F33.41 RECURRENT MAJOR DEPRESSIVE DISORDER, IN PARTIAL REMISSION (HCC): ICD-10-CM

## 2024-11-25 DIAGNOSIS — F90.0 ADHD (ATTENTION DEFICIT HYPERACTIVITY DISORDER), INATTENTIVE TYPE: ICD-10-CM

## 2024-11-25 DIAGNOSIS — F33.1 MODERATE EPISODE OF RECURRENT MAJOR DEPRESSIVE DISORDER (HCC): ICD-10-CM

## 2024-11-25 DIAGNOSIS — F41.1 GENERALIZED ANXIETY DISORDER: ICD-10-CM

## 2024-11-25 RX ORDER — DEXTROAMPHETAMINE SACCHARATE, AMPHETAMINE ASPARTATE MONOHYDRATE, DEXTROAMPHETAMINE SULFATE AND AMPHETAMINE SULFATE 2.5; 2.5; 2.5; 2.5 MG/1; MG/1; MG/1; MG/1
10 CAPSULE, EXTENDED RELEASE ORAL EVERY MORNING
Qty: 30 CAPSULE | Refills: 0 | Status: SHIPPED | OUTPATIENT
Start: 2024-12-02 | End: 2025-01-01

## 2024-11-25 RX ORDER — BUPROPION HYDROCHLORIDE 150 MG/1
150 TABLET ORAL EVERY MORNING
Qty: 30 TABLET | Refills: 2 | Status: SHIPPED | OUTPATIENT
Start: 2024-12-16

## 2024-11-25 ASSESSMENT — ENCOUNTER SYMPTOMS
WEIGHT LOSS: 0
DOUBLE VISION: 0
CARDIOVASCULAR NEGATIVE: 1
MUSCULOSKELETAL NEGATIVE: 1
NEUROLOGICAL NEGATIVE: 1
GASTROINTESTINAL NEGATIVE: 1
RESPIRATORY NEGATIVE: 1

## 2024-11-25 ASSESSMENT — FIBROSIS 4 INDEX: FIB4 SCORE: 0.46

## 2024-11-25 NOTE — PROGRESS NOTES
"[facility] Outpatient Psychiatric Follow Up Note  Evaluation completed by: Jourdan Antonio D.O.   Date of Service: 11/25/2024  Appointment type: in-office appointment.  Attending:  Rangel Menon M.D.   Information below was collected from: patient    CHIEF COMPLIANT:  Medication Management (ADHD, inattentive type, Generalized anxiety disorder, Moderate episode of recurrent major depressive disorder. )    HPI:   Cathy Spencer is a 28 y.o. old female who presents today for regularly scheduled follow up for assessment of Medication Management (ADHD, inattentive type, Generalized anxiety disorder, Moderate episode of recurrent major depressive disorder. )    She passed the defense of her thesis. Mood has been \"okay\", has been nervous about making final changes to her thesis. Has found herself thinking the worst in any given situation but is attempting to rewire her thought processes, cutting herself a break. No significant depression. Stimulant continues to clear up the \"brain fog\". No headaches, no sleep impairment. No self-harm behavior or thoughts of suicide. No HI.     PSYCHIATRIC REVIEW OF SYSTEMS:current symptoms as reported by pt.  Depression: Denies depressed mood or anhedonia  Xochitl: Patient denies any change in mood, increased energy, or marked irritability  Anxiety/Panic Attacks: racing thoughts, feelings of losing control, difficulty concentrating. No panic attacks.   Psychosis: Patient reports no signs or symptoms indicative of psychosis  ADHD: has difficulty sustaining attention in tasks or play activities, is easily distracted by extraneous stimuli    CURRENT MEDICATIONS    Current Outpatient Medications:     [START ON 12/16/2024] buPROPion (WELLBUTRIN XL) 150 MG XL tablet, Take 1 Tablet by mouth every morning., Disp: 30 Tablet, Rfl: 2    [START ON 12/2/2024] amphetamine-dextroamphetamine XR (ADDERALL XR, 10MG,) 10 MG CAPSULE SR 24 HR, Take 1 Capsule by mouth every morning for 30 days., " Disp: 30 Capsule, Rfl: 0    busPIRone (BUSPAR) 30 MG tablet, Take 1 Tablet by mouth 2 times a day., Disp: 180 Tablet, Rfl: 2    sertraline (ZOLOFT) 100 MG Tab, Take 2 Tablets by mouth every day., Disp: 60 Tablet, Rfl: 3    loratadine (CLARITIN) 10 MG Tab, Take 10 mg by mouth every day., Disp: , Rfl:      REVIEW OF SYSTEMS   Review of Systems   Constitutional:  Negative for malaise/fatigue and weight loss.   Eyes:  Negative for double vision.   Respiratory: Negative.     Cardiovascular: Negative.    Gastrointestinal: Negative.    Musculoskeletal: Negative.    Skin:  Negative for rash.   Neurological: Negative.      Neurologic: no tics, tremors, dyskinesias. The patient denies dizzniess, syncope, falls. Ambulates independently    PAST MEDICAL HISTORY  Past Medical History:   Diagnosis Date    Anxiety     Bowel habit changes     constipation    Depression     Heart burn     Indigestion     Pain 12/21/2018    back and abd., 1-2/10     No Known Allergies  Past Surgical History:   Procedure Laterality Date    GABO BY LAPAROSCOPY N/A 12/26/2018    Procedure: GABO BY LAPAROSCOPY;  Surgeon: Gene Covarrubias M.D.;  Location: SURGERY [facility];  Service: General    OTHER SURGICAL PROCEDURE  08/2018    EGD      Family History   Problem Relation Age of Onset    Arthritis Mother     Psychiatric Illness Mother     Diabetes Father     Stroke Father     Heart Disease Father     Psychiatric Illness Other     Bipolar disorder Other      Social History     Socioeconomic History    Marital status: Single   Tobacco Use    Smoking status: Never    Smokeless tobacco: Never   Substance and Sexual Activity    Alcohol use: Yes     Comment: 2/month    Drug use: Yes     Types: Marijuana, Inhaled, Oral     Comment: marijuana last used 12/23     Past Surgical History:   Procedure Laterality Date    GABO BY LAPAROSCOPY N/A 12/26/2018    Procedure: GABO BY LAPAROSCOPY;  Surgeon: Gene Covarrubias M.D.;  Location: SURGERY [facility]  "[facility];  Service: General    OTHER SURGICAL PROCEDURE  08/2018    EGD       PSYCHIATRIC EXAMINATION   /70 (BP Location: Left arm, Patient Position: Sitting, BP Cuff Size: Adult)   Pulse 94   Ht 1.689 m (5' 6.5\")   Wt 99.8 kg (220 lb)   SpO2 95%   BMI 34.98 kg/m²   Musculoskeletal: No abnormal movements noted and wnl  Appearance: well-developed, well-nourished, appears stated age, no apparent distress, and appropriately dressed, cooperative, engaged, friendly, pleasant, and good eye contact. Anxious, fidgeting  Thought Process:  linear, coherent, and goal-oriented  Abnormal or Psychotic Thoughts: No evidence of auditory or visual hallucinations, and no overt delusions noted  Speech: regular rate, rhythm, volume, tone, and syntax  Mood: \"ok\"  Affect: euthymic and congruent with mood, restricted range, anxious but does display reactivity  SI/HI: Denies SI and HI  Orientation: alert and oriented  Recent and Remote Memory: no gross impairment in immediate, recent, or remote memory  Attention Span and Concentration: no apparent deficits  Insight/Judgement into symptoms: good  Neurological Testing (MSSE Score and/or clock drawing): MMSE not performed during this encounter    SCREENINGS:      10/19/2021    11:05 AM 4/19/2022     9:30 AM 1/22/2024    11:52 AM   Depression Screen (PHQ-2/PHQ-9)   PHQ-2 Total Score 0  4   PHQ-2 Total Score  1    PHQ-9 Total Score 6  21   PHQ-9 Total Score  9      [place]  records   reviewed.  No concerns about misuse of controlled substance.    CURRENT RISK ASSESSMENT       Suicide: Low       Homicide: Low       Self-Harm: Low       Relapse: Low       Crisis Safety Plan Reviewed Not Indicated    ASSESSMENT/DIAGNOSES/PLAN  Problem List Items Addressed This Visit          Psychiatry Problems    Generalized anxiety disorder     Improving with addition of stimulant to her regimen. Some of the anxiety may have been attributed to ADHD, anxiety over incomplete tasks. Continue sertraline " 200mg. Undergoing weekly therapy. Encouraging behavioral intervention.          Relevant Medications    buPROPion (WELLBUTRIN XL) 150 MG XL tablet (Start on 12/16/2024)    ADHD (attention deficit hyperactivity disorder), inattentive type    Recurrent major depressive disorder, in partial remission (HCC)     Improving with treatment of ADHD. Less negative self-talk. Greater self-worth. Will continue current regimen. No concerns for self-harm and/or suicide. As she has now achieved stability and completed her thesis defense, will work to consolidate her medication regimen. Bupropion was initially started prior to a diagnosis of ADHD; it would be more effective to wean off this medication and instead optimize her stimulant regimen. This would decrease polypharmacy and overall noradrenergic effect. She will take her current 300mg dose until she completes her current refill before transitioning to XR 150mg. If tolerable, will completely take her off the medication at the next appointment.          Relevant Medications    buPROPion (WELLBUTRIN XL) 150 MG XL tablet (Start on 12/16/2024)     Other Visit Diagnoses       Moderate episode of recurrent major depressive disorder (HCC)        Relevant Medications    buPROPion (WELLBUTRIN XL) 150 MG XL tablet (Start on 12/16/2024)    amphetamine-dextroamphetamine XR (ADDERALL XR, 10MG,) 10 MG CAPSULE SR 24 HR (Start on 12/2/2024)               Medication options, alternatives (including no medications) and medication risks/benefits/side effects were discussed in detail.  The patient was advised to call, message clinician on [employer], or come in to the clinic if symptoms worsen or if questions/issues regarding their medications arise.  The patient verbalized understanding and agreement.    The patient was educated to call 911, call the suicide hotline, or go to the local ER if having thoughts of suicide or homicide.  The patient verbalized understanding and agreement.   The proposed  treatment plan was discussed with the patient who was provided the opportunity to ask questions and make suggestions regarding alternative treatment. Patient verbalized understanding and expressed agreement with the plan.      Follow up in 6-8 weeks    This appointment was supervised by attending psychiatrist, Rangel Menon M.D. , who agrees with assessment and treatment plan.  See attending attestation for more details.

## 2024-11-26 NOTE — ASSESSMENT & PLAN NOTE
Improving with addition of stimulant to her regimen. Some of the anxiety may have been attributed to ADHD, anxiety over incomplete tasks. Continue sertraline 200mg. Undergoing weekly therapy. Encouraging behavioral intervention.

## 2024-11-26 NOTE — ASSESSMENT & PLAN NOTE
Improving with treatment of ADHD. Less negative self-talk. Greater self-worth. Will continue current regimen. No concerns for self-harm and/or suicide. As she has now achieved stability and completed her thesis defense, will work to consolidate her medication regimen. Bupropion was initially started prior to a diagnosis of ADHD; it would be more effective to wean off this medication and instead optimize her stimulant regimen. This would decrease polypharmacy and overall noradrenergic effect. She will take her current 300mg dose until she completes her current refill before transitioning to XR 150mg. If tolerable, will completely take her off the medication at the next appointment.

## 2025-01-13 ENCOUNTER — OFFICE VISIT (OUTPATIENT)
Dept: BEHAVIORAL HEALTH | Facility: PSYCHIATRIC FACILITY | Age: 30
End: 2025-01-13
Payer: COMMERCIAL

## 2025-01-13 VITALS
OXYGEN SATURATION: 95 % | SYSTOLIC BLOOD PRESSURE: 112 MMHG | WEIGHT: 220 LBS | BODY MASS INDEX: 34.53 KG/M2 | HEART RATE: 89 BPM | HEIGHT: 67 IN | DIASTOLIC BLOOD PRESSURE: 66 MMHG

## 2025-01-13 DIAGNOSIS — F90.0 ADHD (ATTENTION DEFICIT HYPERACTIVITY DISORDER), INATTENTIVE TYPE: ICD-10-CM

## 2025-01-13 DIAGNOSIS — F41.1 GENERALIZED ANXIETY DISORDER: ICD-10-CM

## 2025-01-13 DIAGNOSIS — F33.42 RECURRENT MAJOR DEPRESSION IN FULL REMISSION (HCC): ICD-10-CM

## 2025-01-13 PROBLEM — F33.41 RECURRENT MAJOR DEPRESSIVE DISORDER, IN PARTIAL REMISSION (HCC): Status: RESOLVED | Noted: 2024-09-23 | Resolved: 2025-01-13

## 2025-01-13 PROCEDURE — 3074F SYST BP LT 130 MM HG: CPT

## 2025-01-13 PROCEDURE — 3078F DIAST BP <80 MM HG: CPT

## 2025-01-13 PROCEDURE — 99214 OFFICE O/P EST MOD 30 MIN: CPT

## 2025-01-13 RX ORDER — DEXTROAMPHETAMINE SACCHARATE, AMPHETAMINE ASPARTATE MONOHYDRATE, DEXTROAMPHETAMINE SULFATE AND AMPHETAMINE SULFATE 2.5; 2.5; 2.5; 2.5 MG/1; MG/1; MG/1; MG/1
10 CAPSULE, EXTENDED RELEASE ORAL EVERY MORNING
Qty: 30 CAPSULE | Refills: 0 | Status: SHIPPED | OUTPATIENT
Start: 2025-02-13 | End: 2025-03-15

## 2025-01-13 RX ORDER — DEXTROAMPHETAMINE SACCHARATE, AMPHETAMINE ASPARTATE MONOHYDRATE, DEXTROAMPHETAMINE SULFATE AND AMPHETAMINE SULFATE 2.5; 2.5; 2.5; 2.5 MG/1; MG/1; MG/1; MG/1
10 CAPSULE, EXTENDED RELEASE ORAL EVERY MORNING
Qty: 30 CAPSULE | Refills: 0 | Status: SHIPPED | OUTPATIENT
Start: 2025-01-13 | End: 2025-02-12

## 2025-01-13 ASSESSMENT — ENCOUNTER SYMPTOMS
MUSCULOSKELETAL NEGATIVE: 1
WEIGHT LOSS: 0
CARDIOVASCULAR NEGATIVE: 1
GASTROINTESTINAL NEGATIVE: 1
DOUBLE VISION: 0
RESPIRATORY NEGATIVE: 1
NEUROLOGICAL NEGATIVE: 1

## 2025-01-13 ASSESSMENT — FIBROSIS 4 INDEX: FIB4 SCORE: 0.48

## 2025-01-13 NOTE — ASSESSMENT & PLAN NOTE
In complete remission. Will continue sertraline 200mg. Plan to wean off bupropion to lessen adrenergic burden (also on stimulant). No discernible benefit noted with the bupropion. This would in addition allow for optimization of the stimulant regiment to better suit her needs. Instructed her to decrease the medication in a stepwise fashion. To take the 150mg dose for two weeks before discontinuing it completely.

## 2025-01-13 NOTE — PROGRESS NOTES
"[facility] Outpatient Psychiatric Follow Up Note  Evaluation completed by: Jourdan Antonio D.O.   Date of Service: 01/13/2025  Appointment type: in-office appointment.  Attending:  Rangel Menon M.D.   Information below was collected from: patient    CHIEF COMPLIANT:  Medication Management (Depression, OCD, generalized anxiety disorder and ADHD, inattentive type)    HPI:   Cathy Spencer is a 29 y.o. old female who presents today for regularly scheduled follow up for assessment of Medication Management (Depression, OCD, generalized anxiety disorder and ADHD, inattentive type)    Looking to publish her thesis in a journal. Generally, has been feeling \"pretty good\". Has been working on cleaning her apartment, performing home exercises. 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 There is some anxiety, as would be expected, with the uncertainty of her future. No significant depression. She denies SI, HI or AVH.     Agreeable to weaning off bupropion as she does not notice a discernible difference with the medication. She is not sure if she is taking 150 or 300mg of the XR dose. She continues to tolerate the Adderall, no side effects. Over the holidays she did refrain from using the stimulant. Did encourage taking breaks from the medication to curb tolerance.     PSYCHIATRIC REVIEW OF SYSTEMS:current symptoms as reported by pt.  Depression: Denies depressed mood or anhedonia  Xochitl: Patient denies any change in mood, increased energy, or marked irritability  Anxiety/Panic Attacks: Denies any anxiety associated symptoms, racing thoughts  Psychosis: Patient reports no signs or symptoms indicative of psychosis  ADHD: has difficulty sustaining attention in tasks or play activities, is easily distracted by extraneous stimuli albeit improved from prior to the stimulant.     CURRENT MEDICATIONS    Current Outpatient Medications:     busPIRone (BUSPAR) 30 MG " tablet, Take 1 Tablet by mouth 2 times a day., Disp: 180 Tablet, Rfl: 2    sertraline (ZOLOFT) 100 MG Tab, Take 2 Tablets by mouth every day., Disp: 60 Tablet, Rfl: 3    buPROPion (WELLBUTRIN XL) 150 MG XL tablet, Take 1 Tablet by mouth every morning., Disp: 30 Tablet, Rfl: 2    loratadine (CLARITIN) 10 MG Tab, Take 10 mg by mouth every day., Disp: , Rfl:      REVIEW OF SYSTEMS   Review of Systems   Constitutional:  Negative for malaise/fatigue and weight loss.   Eyes:  Negative for double vision.   Respiratory: Negative.     Cardiovascular: Negative.    Gastrointestinal: Negative.    Musculoskeletal: Negative.    Skin:  Negative for rash.   Neurological: Negative.      Neurologic: no tics, tremors, dyskinesias. The patient denies dizziness, syncope, falls. Ambulates independently    PAST MEDICAL HISTORY  Past Medical History:   Diagnosis Date    Anxiety     Bowel habit changes     constipation    Depression     Heart burn     Indigestion     Pain 12/21/2018    back and abd., 1-2/10     No Known Allergies  Past Surgical History:   Procedure Laterality Date    GABO BY LAPAROSCOPY N/A 12/26/2018    Procedure: GABO BY LAPAROSCOPY;  Surgeon: Gene Covarrubias M.D.;  Location: SURGERY [facility];  Service: General    OTHER SURGICAL PROCEDURE  08/2018    EGD      Family History   Problem Relation Age of Onset    Arthritis Mother     Psychiatric Illness Mother     Diabetes Father     Stroke Father     Heart Disease Father     Psychiatric Illness Other     Bipolar disorder Other      Social History     Socioeconomic History    Marital status: Single   Tobacco Use    Smoking status: Never    Smokeless tobacco: Never   Substance and Sexual Activity    Alcohol use: Yes     Comment: 2/month    Drug use: Yes     Types: Marijuana, Inhaled, Oral     Comment: marijuana last used 12/23     Past Surgical History:   Procedure Laterality Date    GABO BY LAPAROSCOPY N/A 12/26/2018    Procedure: GABO BY LAPAROSCOPY;  Surgeon:  "Gene Covarrubias M.D.;  Location: SURGERY [facility];  Service: General    OTHER SURGICAL PROCEDURE  08/2018    EGD       PSYCHIATRIC EXAMINATION   /66 (BP Location: Left arm, Patient Position: Sitting, BP Cuff Size: Adult)   Pulse 89   Ht 1.689 m (5' 6.5\")   Wt 99.8 kg (220 lb)   SpO2 95%   BMI 34.98 kg/m²   Musculoskeletal: No abnormal movements noted  Appearance: well-developed, appears stated age, and appropriately dressed, cooperative, engaged, and good eye contact  Thought Process:  linear, coherent, and goal-oriented  Abnormal or Psychotic Thoughts: No evidence of auditory or visual hallucinations, and no overt delusions noted  Speech: regular rate, rhythm and rate  Mood: \"ok\"  Affect: euthymic and congruent with mood  SI/HI: Denies SI and HI  Orientation: alert and oriented  Recent and Remote Memory: no gross impairment in immediate, recent, or remote memory  Attention Span and Concentration: no repeated questions  Insight/Judgement into symptoms: good  Neurological Testing (MSSE Score and/or clock drawing): MMSE performed and wnl    SCREENINGS:      10/19/2021    11:05 AM 4/19/2022     9:30 AM 1/22/2024    11:52 AM   Depression Screen (PHQ-2/PHQ-9)   PHQ-2 Total Score 0  4   PHQ-2 Total Score  1    PHQ-9 Total Score 6  21   PHQ-9 Total Score  9      PREVENTATIVE CARE  Medication Monitoring: Stimulants: Reviewed height, weight, blood pressure, and pulse.  No concerns.     [place]  records   reviewed.  No concerns about misuse of controlled substance.    CURRENT RISK ASSESSMENT       Suicide: Low       Homicide: Low       Self-Harm: Low       Relapse: Low       Crisis Safety Plan Reviewed Not Indicated    ASSESSMENT/DIAGNOSES/PLAN  Problem List Items Addressed This Visit          Psychiatry Problems    Generalized anxiety disorder     Improving with addition of stimulant to her regimen. Some of the anxiety may have been attributed to ADHD, anxiety over incomplete tasks. Continue sertraline " 200mg. Undergoing weekly therapy. Encouraging behavioral intervention.          ADHD (attention deficit hyperactivity disorder), inattentive type     Regarding symptoms of inattention, previously described inability to sustain attention, constant distractibility, and issues with organization. She has difficulty keeping responsibilities ie appointments, meetings and is often misplacing objects. She reports avoiding mentally taxing tasks. Her brother has a diagnosis of ADHD and is medicated with stimulants. These symptoms did have origin during childhood. Was in gifted classes but it is likely that anxiety drove her to meet expectations. Reports improvement in her ability to focus on tasks, finds herself more productive and is having less negative self-talk. Will continue medication stimulant regimen. Will instead to reinforce behavioral interventions ie organizational skills. No concerns on PDMP.                Recurrent major depression in full remission (HCC)     In complete remission. Will continue sertraline 200mg. Plan to wean off bupropion to lessen adrenergic burden (also on stimulant). No discernible benefit noted with the bupropion. This would in addition allow for optimization of the stimulant regiment to better suit her needs. Instructed her to decrease the medication in a stepwise fashion. To take the 150mg dose for two weeks before discontinuing it completely.                Medication options, alternatives (including no medications) and medication risks/benefits/side effects were discussed in detail.  The patient was advised to call, message clinician on [email or web address], or come in to the clinic if symptoms worsen or if questions/issues regarding their medications arise.  The patient verbalized understanding and agreement.    The patient was educated to call 911, call the suicide hotline, or go to the local ER if having thoughts of suicide or homicide.  The patient verbalized understanding and agreement.   The  proposed treatment plan was discussed with the patient who was provided the opportunity to ask questions and make suggestions regarding alternative treatment. Patient verbalized understanding and expressed agreement with the plan.      Follow up in 6-8 weeks    This appointment was supervised by attending psychiatrist, Rangel Menon M.D. , who agrees with assessment and treatment plan.  See attending attestation for more details.

## 2025-01-13 NOTE — ASSESSMENT & PLAN NOTE
Regarding symptoms of inattention, previously described inability to sustain attention, constant distractibility, and issues with organization. She has difficulty keeping responsibilities ie appointments, meetings and is often misplacing objects. She reports avoiding mentally taxing tasks. Her brother has a diagnosis of ADHD and is medicated with stimulants. These symptoms did have origin during childhood. Was in gifted classes but it is likely that anxiety drove her to meet expectations. Reports improvement in her ability to focus on tasks, finds herself more productive and is having less negative self-talk. Will continue medication stimulant regimen. Will instead to reinforce behavioral interventions ie organizational skills. No concerns on PDMP.

## 2025-02-27 DIAGNOSIS — F42.9 OBSESSIVE-COMPULSIVE DISORDER, UNSPECIFIED TYPE: ICD-10-CM

## 2025-03-03 ENCOUNTER — OFFICE VISIT (OUTPATIENT)
Dept: BEHAVIORAL HEALTH | Facility: PSYCHIATRIC FACILITY | Age: 30
End: 2025-03-03
Payer: COMMERCIAL

## 2025-03-03 VITALS
DIASTOLIC BLOOD PRESSURE: 76 MMHG | HEART RATE: 86 BPM | OXYGEN SATURATION: 94 % | BODY MASS INDEX: 33.27 KG/M2 | HEIGHT: 67 IN | SYSTOLIC BLOOD PRESSURE: 120 MMHG | WEIGHT: 212 LBS

## 2025-03-03 DIAGNOSIS — F41.1 GENERALIZED ANXIETY DISORDER: ICD-10-CM

## 2025-03-03 DIAGNOSIS — F42.9 OBSESSIVE-COMPULSIVE DISORDER, UNSPECIFIED TYPE: ICD-10-CM

## 2025-03-03 DIAGNOSIS — F90.0 ADHD (ATTENTION DEFICIT HYPERACTIVITY DISORDER), INATTENTIVE TYPE: ICD-10-CM

## 2025-03-03 PROBLEM — F33.42 RECURRENT MAJOR DEPRESSION IN FULL REMISSION (HCC): Status: RESOLVED | Noted: 2025-01-13 | Resolved: 2025-03-03

## 2025-03-03 PROCEDURE — 99999 PR NO CHARGE: CPT

## 2025-03-03 RX ORDER — DEXTROAMPHETAMINE SACCHARATE, AMPHETAMINE ASPARTATE MONOHYDRATE, DEXTROAMPHETAMINE SULFATE AND AMPHETAMINE SULFATE 2.5; 2.5; 2.5; 2.5 MG/1; MG/1; MG/1; MG/1
10 CAPSULE, EXTENDED RELEASE ORAL EVERY MORNING
Qty: 30 CAPSULE | Refills: 0 | Status: SHIPPED | OUTPATIENT
Start: 2025-03-03 | End: 2025-04-02

## 2025-03-03 RX ORDER — SERTRALINE HYDROCHLORIDE 100 MG/1
200 TABLET, FILM COATED ORAL DAILY
Qty: 180 TABLET | Refills: 1 | Status: SHIPPED | OUTPATIENT
Start: 2025-03-03

## 2025-03-03 RX ORDER — BUSPIRONE HYDROCHLORIDE 30 MG/1
30 TABLET ORAL 2 TIMES DAILY
Qty: 180 TABLET | Refills: 2 | Status: SHIPPED | OUTPATIENT
Start: 2025-03-03

## 2025-03-03 RX ORDER — SERTRALINE HYDROCHLORIDE 100 MG/1
200 TABLET, FILM COATED ORAL DAILY
Qty: 180 TABLET | Refills: 1 | Status: SHIPPED | OUTPATIENT
Start: 2025-03-03 | End: 2025-03-03 | Stop reason: SDUPTHER

## 2025-03-03 RX ORDER — DEXTROAMPHETAMINE SACCHARATE, AMPHETAMINE ASPARTATE MONOHYDRATE, DEXTROAMPHETAMINE SULFATE AND AMPHETAMINE SULFATE 2.5; 2.5; 2.5; 2.5 MG/1; MG/1; MG/1; MG/1
10 CAPSULE, EXTENDED RELEASE ORAL EVERY MORNING
Qty: 30 CAPSULE | Refills: 0 | Status: SHIPPED | OUTPATIENT
Start: 2025-04-03 | End: 2025-05-03

## 2025-03-03 ASSESSMENT — ENCOUNTER SYMPTOMS
NEUROLOGICAL NEGATIVE: 1
CARDIOVASCULAR NEGATIVE: 1
DOUBLE VISION: 0
MUSCULOSKELETAL NEGATIVE: 1
RESPIRATORY NEGATIVE: 1
GASTROINTESTINAL NEGATIVE: 1
WEIGHT LOSS: 0

## 2025-03-03 ASSESSMENT — PATIENT HEALTH QUESTIONNAIRE - PHQ9: CLINICAL INTERPRETATION OF PHQ2 SCORE: 0

## 2025-03-03 ASSESSMENT — FIBROSIS 4 INDEX: FIB4 SCORE: 0.48

## 2025-03-03 NOTE — PROGRESS NOTES
"[facility] Outpatient Psychiatric Follow Up Note  Evaluation completed by: Jourdan Antonio D.O.   Date of Service: 03/03/2025  Appointment type: in-office appointment.  Attending:  Rangel Menon M.D.   Information below was collected from: patient    CHIEF COMPLIANT:  Medication Management (ADHD, predominantly inattentive, JONNY, OCD and recurrent depression. )    HPI:   Cathy Spencer is a 29 y.o. old female who presents today for regularly scheduled follow up for assessment of Medication Management (ADHD, predominantly inattentive, JONNY, OCD and recurrent depression. )    At the most recent appointment, plans were to taper off bupropion to allow for greater optimization of the stimulant regimen. Reports that her mood has been \"okay to good\". Completely off the bupropion as of a couple days ago; no major changes in mood thus far. Currently trying to figure out next steps - career and location. 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 The idea of change, starting something new and potentially failing has been anxiety-provoking and causing an uptick in intrusive thoughts. Has been keeping self busy though by writing a screenplay. No depressive symptoms. No SI, HI. Is still in weekly therapy; behavioral activation has been the recent focus.     Has been displaying greater focus on tasks and responsibilities with the stimulant. Without the medication, finds herself easily distracted, unable to complete tasks. Current dose has been beneficial for amount of responsibilities. No increase in anxiety or intrusive thoughts. No changes in appetite. No issues with sleep; around 8 hours most nights. Energy levels vary day to day.     PSYCHIATRIC REVIEW OF SYSTEMS:current symptoms as reported by pt.  Depression: Denies depressed mood or anhedonia  Xochitl: Patient denies any change in mood, increased energy, or marked irritability  Anxiety/Panic Attacks: rumination, difficulty concentrating and working " towards goals.  Psychosis: Patient reports no signs or symptoms indicative of psychosis  ADHD: see HPI    CURRENT MEDICATIONS    Current Outpatient Medications:     sertraline (ZOLOFT) 100 MG Tab, Take 2 Tablets by mouth every day., Disp: 180 Tablet, Rfl: 1    busPIRone (BUSPAR) 30 MG tablet, Take 1 Tablet by mouth 2 times a day., Disp: 180 Tablet, Rfl: 2    amphetamine-dextroamphetamine XR (ADDERALL XR, 10MG,) 10 MG CAPSULE SR 24 HR, Take 1 Capsule by mouth every morning for 30 days., Disp: 30 Capsule, Rfl: 0    [START ON 4/3/2025] amphetamine-dextroamphetamine XR (ADDERALL XR, 10MG,) 10 MG CAPSULE SR 24 HR, Take 1 Capsule by mouth every morning for 30 days., Disp: 30 Capsule, Rfl: 0    loratadine (CLARITIN) 10 MG Tab, Take 10 mg by mouth every day., Disp: , Rfl:      REVIEW OF SYSTEMS   Review of Systems   Constitutional:  Negative for malaise/fatigue and weight loss.   Eyes:  Negative for double vision.   Respiratory: Negative.     Cardiovascular: Negative.    Gastrointestinal: Negative.    Musculoskeletal: Negative.    Skin:  Negative for rash.   Neurological: Negative.      Neurologic: no tics, tremors, dyskinesias. The patient denies dizziness, syncope, falls. Ambulates independently    PAST MEDICAL HISTORY  Past Medical History:   Diagnosis Date    Anxiety     Bowel habit changes     constipation    Depression     Heart burn     Indigestion     Pain 12/21/2018    back and abd., 1-2/10     No Known Allergies  Past Surgical History:   Procedure Laterality Date    GABO BY LAPAROSCOPY N/A 12/26/2018    Procedure: GABO BY LAPAROSCOPY;  Surgeon: Gene Covarrubias M.D.;  Location: SURGERY [facility];  Service: General    OTHER SURGICAL PROCEDURE  08/2018    EGD      Family History   Problem Relation Age of Onset    Arthritis Mother     Psychiatric Illness Mother     Diabetes Father     Stroke Father     Heart Disease Father     Psychiatric Illness Other     Bipolar disorder Other      Social History  "    Socioeconomic History    Marital status: Single   Tobacco Use    Smoking status: Never    Smokeless tobacco: Never   Substance and Sexual Activity    Alcohol use: Yes     Comment: 2/month    Drug use: Yes     Types: Marijuana, Inhaled, Oral     Comment: marijuana last used 12/23     Past Surgical History:   Procedure Laterality Date    GABO BY LAPAROSCOPY N/A 12/26/2018    Procedure: GABO BY LAPAROSCOPY;  Surgeon: Gene Covarrubias M.D.;  Location: SURGERY [facility];  Service: General    OTHER SURGICAL PROCEDURE  08/2018    EGD       PSYCHIATRIC EXAMINATION   /76 (BP Location: Left arm, Patient Position: Sitting, BP Cuff Size: Adult)   Pulse 86   Ht 1.689 m (5' 6.5\")   Wt 96.2 kg (212 lb)   SpO2 94%   BMI 33.71 kg/m²   Musculoskeletal: No abnormal movements noted and wnl  Appearance: well-developed, well-nourished, appears stated age, no apparent distress, and appropriately dressed  Thought Process:  linear, coherent, and organized  Abnormal or Psychotic Thoughts: No evidence of auditory or visual hallucinations, and no overt delusions noted  Speech: regular rate, rhythm, volume, tone, and syntax, coherent, and normal tone  Mood: \"ok\"  Affect: euthymic, restricted, and congruent with mood  SI/HI: Denies SI and HI  Orientation: alert and oriented  Recent and Remote Memory: no gross impairment in immediate, recent, or remote memory  Attention Span and Concentration: no apparent deficits, no repeated questions.   Insight/Judgement into symptoms: good  Neurological Testing (MSSE Score and/or clock drawing): MMSE not performed during this encounter    SCREENINGS:      4/19/2022     9:30 AM 1/22/2024    11:52 AM 3/3/2025    10:30 AM   Depression Screen (PHQ-2/PHQ-9)   PHQ-2 Total Score  4    PHQ-2 Total Score 1  0   PHQ-9 Total Score  21    PHQ-9 Total Score 9       PREVENTATIVE CARE  Medication Monitoring: Stimulants: Reviewed height, weight, blood pressure, and pulse.  No concerns.     [place]  " records   reviewed.  No concerns about misuse of controlled substance.    CURRENT RISK ASSESSMENT       Suicide: Low       Homicide: Low       Self-Harm: Low       Relapse: Low       Crisis Safety Plan Reviewed Not Indicated    ASSESSMENT/DIAGNOSES/PLAN  Problem List Items Addressed This Visit          Psychiatry Problems    Generalized anxiety disorder    Some of the anxiety may have been attributed to ADHD, anxiety over incomplete tasks. More recently anxiety appears to be related to change, and intrusive thoughts about failure. Continue sertraline 200mg. Undergoing weekly therapy. Encouraging behavioral intervention and CBT thought record journal.           Relevant Medications    sertraline (ZOLOFT) 100 MG Tab    busPIRone (BUSPAR) 30 MG tablet    OCD (obsessive compulsive disorder)    Intrusive thoughts about failure. No increase in these thoughts as a result of the stimulant regimen. Continue sertraline. Encourage CBT thought record journal for these thoughts.          Relevant Medications    sertraline (ZOLOFT) 100 MG Tab    ADHD (attention deficit hyperactivity disorder), inattentive type    Regarding symptoms of inattention, previously described inability to sustain attention, constant distractibility, and issues with organization. She has difficulty keeping responsibilities ie appointments, meetings and is often misplacing objects. She reports avoiding mentally taxing tasks. Her brother has a diagnosis of ADHD and is medicated with stimulants. These symptoms did have origin during childhood. Was in gifted classes but it is likely that anxiety drove her to meet expectations. Reports improvement in her ability to focus on tasks, finds herself more productive and is having less negative self-talk. Will continue medication stimulant regimen. Will instead to reinforce behavioral interventions ie organizational skills. No concerns on PDMP.                Relevant Medications    amphetamine-dextroamphetamine  XR (ADDERALL XR, 10MG,) 10 MG CAPSULE SR 24 HR    amphetamine-dextroamphetamine XR (ADDERALL XR, 10MG,) 10 MG CAPSULE SR 24 HR (Start on 4/3/2025)      Medication options, alternatives (including no medications) and medication risks/benefits/side effects were discussed in detail.  The patient was advised to call, message clinician on [employer], or come in to the clinic if symptoms worsen or if questions/issues regarding their medications arise.  The patient verbalized understanding and agreement.    The patient was educated to call 911, call the suicide hotline, or go to the local ER if having thoughts of suicide or homicide.  The patient verbalized understanding and agreement.   The proposed treatment plan was discussed with the patient who was provided the opportunity to ask questions and make suggestions regarding alternative treatment. Patient verbalized understanding and expressed agreement with the plan.      Follow up in 8 weeks    This appointment was supervised by attending psychiatrist, Rangel Menon M.D. , who agrees with assessment and treatment plan.  See attending attestation for more details.    Spouse

## 2025-03-03 NOTE — ASSESSMENT & PLAN NOTE
Intrusive thoughts about failure. No increase in these thoughts as a result of the stimulant regimen. Continue sertraline. Encourage CBT thought record journal for these thoughts.

## 2025-03-03 NOTE — ASSESSMENT & PLAN NOTE
Some of the anxiety may have been attributed to ADHD, anxiety over incomplete tasks. More recently anxiety appears to be related to change, and intrusive thoughts about failure. Continue sertraline 200mg. Undergoing weekly therapy. Encouraging behavioral intervention and CBT thought record journal.

## 2025-04-28 ENCOUNTER — OFFICE VISIT (OUTPATIENT)
Dept: BEHAVIORAL HEALTH | Facility: PSYCHIATRIC FACILITY | Age: 30
End: 2025-04-28
Payer: COMMERCIAL

## 2025-04-28 VITALS
OXYGEN SATURATION: 96 % | SYSTOLIC BLOOD PRESSURE: 112 MMHG | HEIGHT: 67 IN | DIASTOLIC BLOOD PRESSURE: 64 MMHG | WEIGHT: 224 LBS | BODY MASS INDEX: 35.16 KG/M2 | HEART RATE: 94 BPM

## 2025-04-28 DIAGNOSIS — F90.0 ADHD (ATTENTION DEFICIT HYPERACTIVITY DISORDER), INATTENTIVE TYPE: ICD-10-CM

## 2025-04-28 DIAGNOSIS — F41.1 GENERALIZED ANXIETY DISORDER: ICD-10-CM

## 2025-04-28 ASSESSMENT — ENCOUNTER SYMPTOMS
CARDIOVASCULAR NEGATIVE: 1
WEIGHT LOSS: 0
RESPIRATORY NEGATIVE: 1
GASTROINTESTINAL NEGATIVE: 1
MUSCULOSKELETAL NEGATIVE: 1
DOUBLE VISION: 0
NEUROLOGICAL NEGATIVE: 1

## 2025-04-28 ASSESSMENT — FIBROSIS 4 INDEX: FIB4 SCORE: 0.48

## 2025-04-28 NOTE — ASSESSMENT & PLAN NOTE
More recently anxiety appears to be related to change, and career direction. Continue sertraline 200mg. Undergoing weekly therapy. Encouraging behavioral intervention and CBT thought record journal. Today discussed breathing techniques.

## 2025-04-28 NOTE — PROGRESS NOTES
"[facility] Outpatient Psychiatric Follow Up Note  Evaluation completed by: Jourdan Antonio D.O.   Date of Service: 04/28/2025  Appointment type: in-office appointment.  Attending:  Dr. Campos  Information below was collected from: patient    CHIEF COMPLIANT:  Medication Management (For ADHD, predominately inattentive type and generalized anxiety disorder)    HPI:   Cathy Spencer is a 29 y.o. old female who presents today for regularly scheduled follow up for assessment of Medication Management (For ADHD, predominately inattentive type and generalized anxiety disorder)    Reports that she's handling the daily responsibilities to a greater degree. Managing her time, keeping her space organized. Despite this, has noticed that she is a bit distracted and not productive, which she attributes to her two week holiday without her stimulant. Is planning to restart the medication soon.     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 She has been working in weekly therapy to circumvent these fears. Discussed CBT and breathing exercises in clinic today. Despite this overarching anxiety, she reports her overall mood as \"pretty good to okay\". Occasional \"low days\", where she is occupied by fear. Provided an example of potentially wanting to cancel her upcoming trip to [place] for a [place] concert because of fear over being in a new city/new experience. This sense of anxiety typically happens once every few weeks, over a range of topics. Panic attacks have been infrequent, generally 1-2x a month characterized by 20 min of panic, shortness of breath, racing heart. Going for a walk, engaging with her dog helps. She also does regular yoga. No SI, HI or AVH. She is content with her current medication regimen.     PSYCHIATRIC REVIEW OF SYSTEMS:current symptoms as reported by pt.  Depression: Denies depressed mood or anhedonia  Xochitl: Patient denies any change in mood, increased energy, or " marked irritability  Anxiety/Panic Attacks: shortness of breath, racing thoughts, difficulty concentrating  Psychosis: Patient reports no signs or symptoms indicative of psychosis  ADHD: has difficulty sustaining attention in tasks or play activities, has difficulty organizing tasks and activities, is easily distracted by extraneous stimuli, is often forgetful in daily activities. Medication has helped decrease these symptoms.     CURRENT MEDICATIONS    Current Outpatient Medications:     sertraline (ZOLOFT) 100 MG Tab, Take 2 Tablets by mouth every day., Disp: 180 Tablet, Rfl: 1    busPIRone (BUSPAR) 30 MG tablet, Take 1 Tablet by mouth 2 times a day., Disp: 180 Tablet, Rfl: 2    amphetamine-dextroamphetamine XR (ADDERALL XR, 10MG,) 10 MG CAPSULE SR 24 HR, Take 1 Capsule by mouth every morning for 30 days., Disp: 30 Capsule, Rfl: 0    loratadine (CLARITIN) 10 MG Tab, Take 10 mg by mouth every day., Disp: , Rfl:      REVIEW OF SYSTEMS   Review of Systems   Constitutional:  Negative for malaise/fatigue and weight loss.   Eyes:  Negative for double vision.   Respiratory: Negative.     Cardiovascular: Negative.    Gastrointestinal: Negative.    Musculoskeletal: Negative.    Skin:  Negative for rash.   Neurological: Negative.      Neurologic: no tics, tremors, dyskinesias. The patient denies dizziness, syncope, falls. Ambulates independently    PAST MEDICAL HISTORY  Past Medical History:   Diagnosis Date    Anxiety     Bowel habit changes     constipation    Depression     Heart burn     Indigestion     Pain 12/21/2018    back and abd., 1-2/10     No Known Allergies  Past Surgical History:   Procedure Laterality Date    GABO BY LAPAROSCOPY N/A 12/26/2018    Procedure: GABO BY LAPAROSCOPY;  Surgeon: Gene Covarrubias M.D.;  Location: SURGERY [facility];  Service: General    OTHER SURGICAL PROCEDURE  08/2018    EGD      Family History   Problem Relation Age of Onset    Arthritis Mother     Psychiatric Illness  "Mother     Diabetes Father     Stroke Father     Heart Disease Father     Psychiatric Illness Other     Bipolar disorder Other      Social History     Socioeconomic History    Marital status: Single   Tobacco Use    Smoking status: Never    Smokeless tobacco: Never   Substance and Sexual Activity    Alcohol use: Yes     Comment: 2/month    Drug use: Yes     Types: Marijuana, Inhaled, Oral     Comment: marijuana last used 12/23     Past Surgical History:   Procedure Laterality Date    GABO BY LAPAROSCOPY N/A 12/26/2018    Procedure: GABO BY LAPAROSCOPY;  Surgeon: Gene Covarrubias M.D.;  Location: SURGERY [facility];  Service: General    OTHER SURGICAL PROCEDURE  08/2018    EGD     PSYCHIATRIC EXAMINATION   /64 (BP Location: Left arm, Patient Position: Sitting, BP Cuff Size: Large adult)   Pulse 94   Ht 1.689 m (5' 6.5\")   Wt 102 kg (224 lb)   SpO2 96%   BMI 35.61 kg/m²   Musculoskeletal: No abnormal movements noted and wnl  Appearance: well-developed, well-nourished, appears stated age, no apparent distress, and appropriately dressed, cooperative, engaged, friendly, pleasant, and good eye contact  Thought Process:  linear, coherent, and organized  Abnormal or Psychotic Thoughts: No evidence of auditory or visual hallucinations, and no overt delusions noted  Speech: regular rate, rhythm, volume, tone, and syntax, coherent, and normal tone  Mood: \"good\"  Affect: euthymic and congruent with mood, moderate range, non-labile  SI/HI: Denies SI and HI  Orientation: alert and oriented  Recent and Remote Memory: no gross impairment in immediate, recent, or remote memory  Attention Span and Concentration: no apparent deficits, no repeated questions or distractibility   Insight/Judgement into symptoms: good  Neurological Testing (MSSE Score and/or clock drawing): MMSE not performed during this encounter    SCREENINGS:      4/19/2022     9:30 AM 1/22/2024    11:52 AM 3/3/2025    10:30 AM   Depression Screen " (PHQ-2/PHQ-9)   PHQ-2 Total Score  4    PHQ-2 Total Score 1  0   PHQ-9 Total Score  21    PHQ-9 Total Score 9       PREVENTATIVE CARE  Medication Monitoring: Stimulants: Reviewed height, weight, blood pressure, and pulse.      [place]   [place].  No concerns about misuse of controlled substance.    CURRENT RISK ASSESSMENT       Suicide: Low       Homicide: Low       Self-Harm: Low       Relapse: Low       Crisis Safety Plan Reviewed Not Indicated    ASSESSMENT/DIAGNOSES/PLAN  Problem List Items Addressed This Visit          Psychiatry Problems    Generalized anxiety disorder    More recently anxiety appears to be related to change, and career direction. Continue sertraline 200mg. Undergoing weekly therapy. Encouraging behavioral intervention and CBT thought record journal. Today discussed breathing techniques.          ADHD (attention deficit hyperactivity disorder), inattentive type    Regarding symptoms of inattention, previously described inability to sustain attention, constant distractibility, and issues with organization. She has difficulty keeping responsibilities ie appointments, meetings and is often misplacing objects. She reports avoiding mentally taxing tasks. Her brother has a diagnosis of ADHD and is medicated with stimulants. These symptoms did have origin during childhood. Was in gifted classes but it is likely that anxiety drove her to meet expectations. Reports improvement in her ability to focus on tasks, finds herself more productive and is having less negative self-talk. Will continue medication stimulant regimen. Will instead work to reinforce behavioral interventions ie organizational skills, routine.                  Medication options, alternatives (including no medications) and medication risks/benefits/side effects were discussed in detail.  The patient was advised to call, message clinician on [employer], or come in to the clinic if symptoms worsen or if questions/issues regarding  their medications arise.  The patient verbalized understanding and agreement.    The patient was educated to call 911, call the suicide hotline, or go to the local ER if having thoughts of suicide or homicide.  The patient verbalized understanding and agreement.   The proposed treatment plan was discussed with the patient who was provided the opportunity to ask questions and make suggestions regarding alternative treatment. Patient verbalized understanding and expressed agreement with the plan.      Follow up in 6-8 weeks    This appointment was supervised by attending psychiatrist who agrees with assessment and treatment plan. See attending attestation for more details.

## 2025-04-28 NOTE — ASSESSMENT & PLAN NOTE
Regarding symptoms of inattention, previously described inability to sustain attention, constant distractibility, and issues with organization. She has difficulty keeping responsibilities ie appointments, meetings and is often misplacing objects. She reports avoiding mentally taxing tasks. Her brother has a diagnosis of ADHD and is medicated with stimulants. These symptoms did have origin during childhood. Was in gifted classes but it is likely that anxiety drove her to meet expectations. Reports improvement in her ability to focus on tasks, finds herself more productive and is having less negative self-talk. Will continue medication stimulant regimen. Will instead work to reinforce behavioral interventions ie organizational skills, routine.

## 2025-06-16 ENCOUNTER — OFFICE VISIT (OUTPATIENT)
Dept: BEHAVIORAL HEALTH | Facility: PSYCHIATRIC FACILITY | Age: 30
End: 2025-06-16
Payer: COMMERCIAL

## 2025-06-16 VITALS
HEIGHT: 67 IN | SYSTOLIC BLOOD PRESSURE: 123 MMHG | BODY MASS INDEX: 35.16 KG/M2 | WEIGHT: 224 LBS | HEART RATE: 88 BPM | DIASTOLIC BLOOD PRESSURE: 84 MMHG | OXYGEN SATURATION: 96 %

## 2025-06-16 DIAGNOSIS — R52 GENERALIZED PAIN: ICD-10-CM

## 2025-06-16 DIAGNOSIS — F33.1 MODERATE EPISODE OF RECURRENT MAJOR DEPRESSIVE DISORDER (HCC): Primary | ICD-10-CM

## 2025-06-16 DIAGNOSIS — F41.1 GENERALIZED ANXIETY DISORDER: ICD-10-CM

## 2025-06-16 DIAGNOSIS — F90.0 ADHD (ATTENTION DEFICIT HYPERACTIVITY DISORDER), INATTENTIVE TYPE: ICD-10-CM

## 2025-06-16 PROCEDURE — 99999 PR NO CHARGE: CPT

## 2025-06-16 RX ORDER — DEXTROAMPHETAMINE SACCHARATE, AMPHETAMINE ASPARTATE MONOHYDRATE, DEXTROAMPHETAMINE SULFATE AND AMPHETAMINE SULFATE 2.5; 2.5; 2.5; 2.5 MG/1; MG/1; MG/1; MG/1
10 CAPSULE, EXTENDED RELEASE ORAL EVERY MORNING
Qty: 30 CAPSULE | Refills: 0 | Status: SHIPPED | OUTPATIENT
Start: 2025-06-16 | End: 2025-07-16

## 2025-06-16 ASSESSMENT — ENCOUNTER SYMPTOMS
GASTROINTESTINAL NEGATIVE: 1
RESPIRATORY NEGATIVE: 1
MUSCULOSKELETAL NEGATIVE: 1
CARDIOVASCULAR NEGATIVE: 1
NEUROLOGICAL NEGATIVE: 1
DOUBLE VISION: 0
WEIGHT LOSS: 0

## 2025-06-16 ASSESSMENT — FIBROSIS 4 INDEX: FIB4 SCORE: 0.48
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
## 2025-06-16 NOTE — ASSESSMENT & PLAN NOTE
Amotivated, difficulty sleeping, low energy and appetite. Poor concentration. Passive SI. No self-harm behavior. In regular therapy. On max dosing of sertraline. Transition period of her life following completion of masters program has created lots of anxiety which in turn feeds into depression. Father's day being yesterday may have contributed to today's presentation. Considered adjunct with mirtazapine, low dose second gen antipsychotic but caution advised with BMI of 35.61. Can also consider increasing buspirone greater than FDA max of 60mg. Elected to have close follow-up with instructions to restart regular use Adderall XR to aid with motivation, goal completion. Pt was not taking the medication regularly. Can potentially increase the dose temporarily with further assessments.     Marijuana use may be contributing to low mood, levels of anxiety.

## 2025-06-16 NOTE — ASSESSMENT & PLAN NOTE
Regarding symptoms of inattention, previously described inability to sustain attention, constant distractibility, and issues with organization. She has difficulty keeping responsibilities ie appointments, meetings and is often misplacing objects. She reports avoiding mentally taxing tasks. Her brother has a diagnosis of ADHD and is medicated with stimulants. These symptoms did have origin during childhood. Was in gifted classes but it is likely that anxiety drove her to meet expectations. Most recently, she has struggled with amotivation, difficulty with change. May be in part due to ADHD diagnosis not being properly treated. She reported taking the stimulant sparingly. Recommended regular use.

## 2025-06-16 NOTE — ASSESSMENT & PLAN NOTE
More recently anxiety appears to be related to change, and career direction. Continue sertraline 200mg. Undergoing weekly therapy. Encouraging behavioral intervention and CBT thought record journal. Continue to encourage breathing techniques.

## 2025-07-10 SDOH — HEALTH STABILITY: PHYSICAL HEALTH: ON AVERAGE, HOW MANY MINUTES DO YOU ENGAGE IN EXERCISE AT THIS LEVEL?: 40 MIN

## 2025-07-10 SDOH — ECONOMIC STABILITY: TRANSPORTATION INSECURITY
IN THE PAST 12 MONTHS, HAS THE LACK OF TRANSPORTATION KEPT YOU FROM MEDICAL APPOINTMENTS OR FROM GETTING MEDICATIONS?: NO

## 2025-07-10 SDOH — ECONOMIC STABILITY: TRANSPORTATION INSECURITY
IN THE PAST 12 MONTHS, HAS LACK OF RELIABLE TRANSPORTATION KEPT YOU FROM MEDICAL APPOINTMENTS, MEETINGS, WORK OR FROM GETTING THINGS NEEDED FOR DAILY LIVING?: NO

## 2025-07-10 SDOH — ECONOMIC STABILITY: FOOD INSECURITY: WITHIN THE PAST 12 MONTHS, THE FOOD YOU BOUGHT JUST DIDN'T LAST AND YOU DIDN'T HAVE MONEY TO GET MORE.: NEVER TRUE

## 2025-07-10 SDOH — ECONOMIC STABILITY: INCOME INSECURITY: IN THE LAST 12 MONTHS, WAS THERE A TIME WHEN YOU WERE NOT ABLE TO PAY THE MORTGAGE OR RENT ON TIME?: NO

## 2025-07-10 SDOH — HEALTH STABILITY: PHYSICAL HEALTH: ON AVERAGE, HOW MANY DAYS PER WEEK DO YOU ENGAGE IN MODERATE TO STRENUOUS EXERCISE (LIKE A BRISK WALK)?: 7 DAYS

## 2025-07-10 SDOH — ECONOMIC STABILITY: INCOME INSECURITY: HOW HARD IS IT FOR YOU TO PAY FOR THE VERY BASICS LIKE FOOD, HOUSING, MEDICAL CARE, AND HEATING?: NOT HARD AT ALL

## 2025-07-10 SDOH — ECONOMIC STABILITY: FOOD INSECURITY: WITHIN THE PAST 12 MONTHS, YOU WORRIED THAT YOUR FOOD WOULD RUN OUT BEFORE YOU GOT MONEY TO BUY MORE.: NEVER TRUE

## 2025-07-10 SDOH — ECONOMIC STABILITY: HOUSING INSECURITY
IN THE LAST 12 MONTHS, WAS THERE A TIME WHEN YOU DID NOT HAVE A STEADY PLACE TO SLEEP OR SLEPT IN A SHELTER (INCLUDING NOW)?: NO

## 2025-07-10 SDOH — HEALTH STABILITY: MENTAL HEALTH
STRESS IS WHEN SOMEONE FEELS TENSE, NERVOUS, ANXIOUS, OR CAN'T SLEEP AT NIGHT BECAUSE THEIR MIND IS TROUBLED. HOW STRESSED ARE YOU?: ONLY A LITTLE

## 2025-07-10 SDOH — ECONOMIC STABILITY: TRANSPORTATION INSECURITY
IN THE PAST 12 MONTHS, HAS LACK OF TRANSPORTATION KEPT YOU FROM MEETINGS, WORK, OR FROM GETTING THINGS NEEDED FOR DAILY LIVING?: NO

## 2025-07-10 ASSESSMENT — LIFESTYLE VARIABLES
HOW OFTEN DO YOU HAVE SIX OR MORE DRINKS ON ONE OCCASION: NEVER
HOW OFTEN DO YOU HAVE A DRINK CONTAINING ALCOHOL: MONTHLY OR LESS
HOW MANY STANDARD DRINKS CONTAINING ALCOHOL DO YOU HAVE ON A TYPICAL DAY: 1 OR 2
AUDIT-C TOTAL SCORE: 1
SKIP TO QUESTIONS 9-10: 1

## 2025-07-10 ASSESSMENT — SOCIAL DETERMINANTS OF HEALTH (SDOH)
DO YOU BELONG TO ANY CLUBS OR ORGANIZATIONS SUCH AS CHURCH GROUPS UNIONS, FRATERNAL OR ATHLETIC GROUPS, OR SCHOOL GROUPS?: NO
IN THE PAST 12 MONTHS, HAS THE ELECTRIC, GAS, OIL, OR WATER COMPANY THREATENED TO SHUT OFF SERVICE IN YOUR HOME?: NO
HOW OFTEN DO YOU GET TOGETHER WITH FRIENDS OR RELATIVES?: PATIENT DECLINED
ARE YOU MARRIED, WIDOWED, DIVORCED, SEPARATED, NEVER MARRIED, OR LIVING WITH A PARTNER?: NEVER MARRIED
HOW OFTEN DO YOU GET TOGETHER WITH FRIENDS OR RELATIVES?: PATIENT DECLINED
DO YOU BELONG TO ANY CLUBS OR ORGANIZATIONS SUCH AS CHURCH GROUPS UNIONS, FRATERNAL OR ATHLETIC GROUPS, OR SCHOOL GROUPS?: NO
HOW OFTEN DO YOU HAVE A DRINK CONTAINING ALCOHOL: MONTHLY OR LESS
IN A TYPICAL WEEK, HOW MANY TIMES DO YOU TALK ON THE PHONE WITH FAMILY, FRIENDS, OR NEIGHBORS?: MORE THAN THREE TIMES A WEEK
HOW MANY DRINKS CONTAINING ALCOHOL DO YOU HAVE ON A TYPICAL DAY WHEN YOU ARE DRINKING: 1 OR 2
HOW OFTEN DO YOU HAVE SIX OR MORE DRINKS ON ONE OCCASION: NEVER
HOW OFTEN DO YOU ATTEND CHURCH OR RELIGIOUS SERVICES?: PATIENT DECLINED
HOW HARD IS IT FOR YOU TO PAY FOR THE VERY BASICS LIKE FOOD, HOUSING, MEDICAL CARE, AND HEATING?: NOT HARD AT ALL
HOW OFTEN DO YOU ATTEND CHURCH OR RELIGIOUS SERVICES?: PATIENT DECLINED
HOW OFTEN DO YOU ATTENT MEETINGS OF THE CLUB OR ORGANIZATION YOU BELONG TO?: PATIENT DECLINED
ARE YOU MARRIED, WIDOWED, DIVORCED, SEPARATED, NEVER MARRIED, OR LIVING WITH A PARTNER?: NEVER MARRIED
WITHIN THE PAST 12 MONTHS, YOU WORRIED THAT YOUR FOOD WOULD RUN OUT BEFORE YOU GOT THE MONEY TO BUY MORE: NEVER TRUE
IN A TYPICAL WEEK, HOW MANY TIMES DO YOU TALK ON THE PHONE WITH FAMILY, FRIENDS, OR NEIGHBORS?: MORE THAN THREE TIMES A WEEK
HOW OFTEN DO YOU ATTENT MEETINGS OF THE CLUB OR ORGANIZATION YOU BELONG TO?: PATIENT DECLINED

## 2025-07-11 ENCOUNTER — APPOINTMENT (OUTPATIENT)
Dept: MEDICAL GROUP | Facility: MEDICAL CENTER | Age: 30
End: 2025-07-11
Payer: COMMERCIAL

## 2025-07-14 ENCOUNTER — OFFICE VISIT (OUTPATIENT)
Dept: BEHAVIORAL HEALTH | Facility: PSYCHIATRIC FACILITY | Age: 30
End: 2025-07-14
Payer: COMMERCIAL

## 2025-07-14 VITALS
WEIGHT: 227.8 LBS | SYSTOLIC BLOOD PRESSURE: 124 MMHG | BODY MASS INDEX: 35.75 KG/M2 | DIASTOLIC BLOOD PRESSURE: 77 MMHG | OXYGEN SATURATION: 98 % | HEART RATE: 74 BPM | HEIGHT: 67 IN

## 2025-07-14 DIAGNOSIS — F90.0 ADHD (ATTENTION DEFICIT HYPERACTIVITY DISORDER), INATTENTIVE TYPE: ICD-10-CM

## 2025-07-14 DIAGNOSIS — F33.1 MODERATE EPISODE OF RECURRENT MAJOR DEPRESSIVE DISORDER (HCC): Primary | ICD-10-CM

## 2025-07-14 DIAGNOSIS — F41.1 GENERALIZED ANXIETY DISORDER: ICD-10-CM

## 2025-07-14 ASSESSMENT — FIBROSIS 4 INDEX: FIB4 SCORE: 0.48
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
## 2025-07-15 NOTE — PSYCHOTHERAPY
Next session should focus on trying to look at sleeping day-to-day activities and coping strategies with stress.

## (undated) DEVICE — TROCAR Z THREAD12MM OPTICAL - NON BLADED (6/BX)

## (undated) DEVICE — TUBE E-T HI-LO CUFF 7.0MM (10EA/PK)

## (undated) DEVICE — SUTURE 4-0 VICRYL PLUS FS-2 - 27 INCH (36/BX)

## (undated) DEVICE — DERMABOND ADVANCED - (12EA/BX)

## (undated) DEVICE — SYRINGE SAFETY 10 ML 18 GA X 1 1/2 BLUNT LL (100/BX 4BX/CA)

## (undated) DEVICE — LACTATED RINGERS INJ 1000 ML - (14EA/CA 60CA/PF)

## (undated) DEVICE — CANNULA W/SEAL 5X100 Z-THRE - ADED KII (12/BX)

## (undated) DEVICE — TROCAR 5X100 NON BLADED Z-TH - READ KII (6/BX)

## (undated) DEVICE — CANISTER SUCTION 3000ML MECHANICAL FILTER AUTO SHUTOFF MEDI-VAC NONSTERILE LF DISP  (40EA/CA)

## (undated) DEVICE — SUTURE GENERAL

## (undated) DEVICE — TUBING CLEARLINK DUO-VENT - C-FLO (48EA/CA)

## (undated) DEVICE — SET SUCTION/IRRIGATION WITH DISPOSABLE TIP (6/CA )PART #0250-070-520 IS A SUB

## (undated) DEVICE — ELECTRODE 850 FOAM ADHESIVE - HYDROGEL RADIOTRNSPRNT (50/PK)

## (undated) DEVICE — PROTECTOR ULNA NERVE - (36PR/CA)

## (undated) DEVICE — KIT ROOM DECONTAMINATION

## (undated) DEVICE — GLOVE BIOGEL SZ 8 SURGICAL PF LTX - (50PR/BX 4BX/CA)

## (undated) DEVICE — NEPTUNE 4 PORT MANIFOLD - (20/PK)

## (undated) DEVICE — SUTURE 0 COATED VICRYL 6-18IN - (12PK/BX)

## (undated) DEVICE — TUBE CONNECT SUCTION CLEAR 120 X 1/4" (50EA/CA)"

## (undated) DEVICE — SUTURE 4-0 MONOCRYL PLUS PS-2 - 27 INCH (36/BX)

## (undated) DEVICE — BAG RETRIEVAL 10ML (10EA/BX)

## (undated) DEVICE — SENSOR SPO2 NEO LNCS ADHESIVE (20/BX) SEE USER NOTES

## (undated) DEVICE — SUCTION INSTRUMENT YANKAUER BULBOUS TIP W/O VENT (50EA/CA)

## (undated) DEVICE — SET EXTENSION WITH 2 PORTS (48EA/CA) ***PART #2C8610 IS A SUBSTITUTE*****

## (undated) DEVICE — KIT ANESTHESIA W/CIRCUIT & 3/LT BAG W/FILTER (20EA/CA)

## (undated) DEVICE — GLOVE BIOGEL INDICATOR SZ 8 SURGICAL PF LTX - (50/BX 4BX/CA)

## (undated) DEVICE — GLOVE BIOGEL SZ 7.5 SURGICAL PF LTX - (50PR/BX 4BX/CA)

## (undated) DEVICE — SET LEADWIRE 5 LEAD BEDSIDE DISPOSABLE ECG (1SET OF 5/EA)

## (undated) DEVICE — CHLORAPREP 26 ML APPLICATOR - ORANGE TINT(25/CA)

## (undated) DEVICE — PACK LAP CHOLE OR - (2EA/CA)

## (undated) DEVICE — DRAPE STRLE REG TOWEL 18X24 - (10/BX 4BX/CA)"

## (undated) DEVICE — SCISSORS 5MM CVD (6EA/BX)

## (undated) DEVICE — GLOVE BIOGEL SZ 7 SURGICAL PF LTX - (50PR/BX 4BX/CA)

## (undated) DEVICE — ELECTRODE DUAL RETURN W/ CORD - (50/PK)

## (undated) DEVICE — BLADE SURGICAL #15 - (50/BX 3BX/CA)

## (undated) DEVICE — HEAD HOLDER JUNIOR/ADULT

## (undated) DEVICE — GLOVE BIOGEL SZ 6 PF LATEX - (50EA/BX 4BX/CA)

## (undated) DEVICE — GLOVE BIOGEL SZ 6.5 SURGICAL PF LTX (50PR/BX 4BX/CA)

## (undated) DEVICE — CLIP MED LG INTNL HRZN TI ESCP - (20/BX)

## (undated) DEVICE — GOWN SURGEONS X-LARGE - DISP. (30/CA)

## (undated) DEVICE — MASK ANESTHESIA ADULT  - (100/CA)

## (undated) DEVICE — GLOVE BIOGEL INDICATOR SZ 7.5 SURGICAL PF LTX - (50PR/BX 4BX/CA)

## (undated) DEVICE — NEEDLE INSFL 120MM 14GA VRRS - (20/BX)